# Patient Record
Sex: FEMALE | Race: WHITE | NOT HISPANIC OR LATINO | Employment: OTHER | URBAN - METROPOLITAN AREA
[De-identification: names, ages, dates, MRNs, and addresses within clinical notes are randomized per-mention and may not be internally consistent; named-entity substitution may affect disease eponyms.]

---

## 2017-01-17 RX ORDER — ESZOPICLONE 2 MG/1
TABLET, FILM COATED ORAL
Qty: 30 TAB | Refills: 0 | Status: SHIPPED | OUTPATIENT
Start: 2017-01-17 | End: 2017-02-19 | Stop reason: SDUPTHER

## 2017-01-20 ENCOUNTER — TELEPHONE (OUTPATIENT)
Dept: MEDICAL GROUP | Facility: MEDICAL CENTER | Age: 62
End: 2017-01-20

## 2017-01-20 DIAGNOSIS — L30.9 ECZEMA, UNSPECIFIED TYPE: ICD-10-CM

## 2017-01-20 NOTE — TELEPHONE ENCOUNTER
1. Caller Name: Sujata                                         Call Back Number: [phone]       Patient approves a detailed voicemail message: N\A    2. SPECIFIC Action To Be Taken: Referral pending, please sign.    3. Diagnosis/Clinical Reason for Request: Eczema    4. Specialty & Provider Name/Lab/Imaging Location: Dermatology    5. Is appointment scheduled for requested order/referral: no    Patient informed they will receive a return phone call from the office ONLY if there are any questions before processing their request. Advised to call back if they haven't received a call from the referral department in 5 days.

## 2017-02-21 RX ORDER — ESZOPICLONE 2 MG/1
TABLET, FILM COATED ORAL
Qty: 30 TAB | Refills: 0 | Status: SHIPPED | OUTPATIENT
Start: 2017-02-21 | End: 2017-03-01

## 2017-03-01 DIAGNOSIS — Z79.899 MEDICATION MANAGEMENT: ICD-10-CM

## 2017-03-01 RX ORDER — ESTRADIOL 0.5 MG/1
0.5 TABLET ORAL
Qty: 90 TAB | Refills: 3 | Status: SHIPPED | OUTPATIENT
Start: 2017-03-01 | End: 2018-05-05 | Stop reason: SDUPTHER

## 2017-03-01 RX ORDER — ESZOPICLONE 2 MG/1
TABLET, FILM COATED ORAL
Refills: 0 | OUTPATIENT
Start: 2017-03-01

## 2017-03-01 RX ORDER — ESZOPICLONE 2 MG/1
2 TABLET, FILM COATED ORAL
Qty: 30 TAB | Refills: 3 | Status: SHIPPED | OUTPATIENT
Start: 2017-03-01 | End: 2017-06-30 | Stop reason: SDUPTHER

## 2017-03-01 RX ORDER — ESZOPICLONE 2 MG/1
2 TABLET, FILM COATED ORAL
Qty: 30 TAB | Refills: 3 | Status: SHIPPED | OUTPATIENT
Start: 2017-03-01 | End: 2017-03-01 | Stop reason: SDUPTHER

## 2017-03-06 DIAGNOSIS — G44.039 EPISODIC PAROXYSMAL HEMICRANIA, NOT INTRACTABLE: ICD-10-CM

## 2017-03-06 RX ORDER — BUTALBITAL, ACETAMINOPHEN AND CAFFEINE 50; 325; 40 MG/1; MG/1; MG/1
1 TABLET ORAL EVERY 6 HOURS PRN
Qty: 60 TAB | Refills: 6 | Status: SHIPPED | OUTPATIENT
Start: 2017-03-06 | End: 2017-10-09 | Stop reason: SDUPTHER

## 2017-03-23 ENCOUNTER — RX ONLY (OUTPATIENT)
Age: 62
Setting detail: RX ONLY
End: 2017-03-23

## 2017-03-23 ENCOUNTER — HOSPITAL ENCOUNTER (OUTPATIENT)
Facility: MEDICAL CENTER | Age: 62
End: 2017-03-23
Attending: DERMATOLOGY
Payer: COMMERCIAL

## 2017-03-23 LAB
AMBIGUOUS DTTM AMBI4: NORMAL
SIGNIFICANT IND 70042: NORMAL
SITE SITE: NORMAL
SOURCE SOURCE: NORMAL

## 2017-03-23 PROCEDURE — 87070 CULTURE OTHR SPECIMN AEROBIC: CPT

## 2017-03-23 PROCEDURE — 87077 CULTURE AEROBIC IDENTIFY: CPT

## 2017-03-23 PROCEDURE — 87205 SMEAR GRAM STAIN: CPT

## 2017-03-23 PROCEDURE — 87186 SC STD MICRODIL/AGAR DIL: CPT

## 2017-03-24 LAB
GRAM STN SPEC: NORMAL
SIGNIFICANT IND 70042: NORMAL
SITE SITE: NORMAL
SOURCE SOURCE: NORMAL

## 2017-03-25 LAB
BACTERIA WND AEROBE CULT: ABNORMAL
BACTERIA WND AEROBE CULT: ABNORMAL
GRAM STN SPEC: ABNORMAL
SIGNIFICANT IND 70042: ABNORMAL
SITE SITE: ABNORMAL
SOURCE SOURCE: ABNORMAL

## 2017-06-15 ENCOUNTER — TELEPHONE (OUTPATIENT)
Dept: MEDICAL GROUP | Facility: MEDICAL CENTER | Age: 62
End: 2017-06-15

## 2017-06-15 DIAGNOSIS — E78.5 HYPERLIPIDEMIA, UNSPECIFIED HYPERLIPIDEMIA TYPE: ICD-10-CM

## 2017-06-15 DIAGNOSIS — I10 HTN (HYPERTENSION), BENIGN: ICD-10-CM

## 2017-06-15 NOTE — TELEPHONE ENCOUNTER
1. Caller Name: Sujata                      Call Back Number: [phone] (home) [phone] (work)      2. Message: patient has appt. In aug. For physical and also healthy tracks and would like to get her labs drawn before her appt. Can you please submit the orders     3. Patient approves office to leave a detailed voicemail/MyChart message: N/A

## 2017-07-21 ENCOUNTER — HOSPITAL ENCOUNTER (OUTPATIENT)
Dept: LAB | Facility: MEDICAL CENTER | Age: 62
End: 2017-07-21
Attending: INTERNAL MEDICINE
Payer: COMMERCIAL

## 2017-07-21 ENCOUNTER — HOSPITAL ENCOUNTER (OUTPATIENT)
Dept: LAB | Facility: MEDICAL CENTER | Age: 62
End: 2017-07-21
Payer: COMMERCIAL

## 2017-07-21 DIAGNOSIS — I10 HTN (HYPERTENSION), BENIGN: ICD-10-CM

## 2017-07-21 DIAGNOSIS — E78.5 HYPERLIPIDEMIA, UNSPECIFIED HYPERLIPIDEMIA TYPE: ICD-10-CM

## 2017-07-21 LAB
ANION GAP SERPL CALC-SCNC: 9 MMOL/L (ref 0–11.9)
BDY FAT % MEASURED: 34.2 %
BP DIAS: 74 MMHG
BP SYS: 146 MMHG
BUN SERPL-MCNC: 11 MG/DL (ref 8–22)
CALCIUM SERPL-MCNC: 9.1 MG/DL (ref 8.5–10.5)
CHLORIDE SERPL-SCNC: 104 MMOL/L (ref 96–112)
CHOLEST SERPL-MCNC: 174 MG/DL (ref 100–199)
CHOLEST SERPL-MCNC: 175 MG/DL (ref 100–199)
CO2 SERPL-SCNC: 27 MMOL/L (ref 20–33)
CREAT SERPL-MCNC: 0.65 MG/DL (ref 0.5–1.4)
DIABETES HTDIA: NO
EVENT NAME HTEVT: NORMAL
FASTING HTFAS: YES
GFR SERPL CREATININE-BSD FRML MDRD: >60 ML/MIN/1.73 M 2
GLUCOSE SERPL-MCNC: 94 MG/DL (ref 65–99)
GLUCOSE SERPL-MCNC: 94 MG/DL (ref 65–99)
HDLC SERPL-MCNC: 59 MG/DL
HDLC SERPL-MCNC: 59 MG/DL
HYPERTENSION HTHYP: YES
LDLC SERPL CALC-MCNC: 73 MG/DL
LDLC SERPL CALC-MCNC: 74 MG/DL
POTASSIUM SERPL-SCNC: 4.4 MMOL/L (ref 3.6–5.5)
SCREENING LOC CITY HTCIT: NORMAL
SCREENING LOC STATE HTSTA: NORMAL
SCREENING LOCATION HTLOC: NORMAL
SMOKING HTSMO: NO
SODIUM SERPL-SCNC: 140 MMOL/L (ref 135–145)
SUBSCRIBER ID HTSID: NORMAL
TRIGL SERPL-MCNC: 208 MG/DL (ref 0–149)
TRIGL SERPL-MCNC: 209 MG/DL (ref 0–149)

## 2017-07-21 PROCEDURE — 80048 BASIC METABOLIC PNL TOTAL CA: CPT

## 2017-07-21 PROCEDURE — S5190 WELLNESS ASSESSMENT BY NONPH: HCPCS

## 2017-07-21 PROCEDURE — 80061 LIPID PANEL: CPT

## 2017-07-21 PROCEDURE — 36415 COLL VENOUS BLD VENIPUNCTURE: CPT

## 2017-07-21 PROCEDURE — 82947 ASSAY GLUCOSE BLOOD QUANT: CPT

## 2017-07-21 PROCEDURE — 80061 LIPID PANEL: CPT | Mod: 91

## 2017-08-18 ENCOUNTER — OFFICE VISIT (OUTPATIENT)
Dept: MEDICAL GROUP | Facility: MEDICAL CENTER | Age: 62
End: 2017-08-18
Payer: COMMERCIAL

## 2017-08-18 VITALS
HEART RATE: 64 BPM | TEMPERATURE: 97.9 F | WEIGHT: 144 LBS | HEIGHT: 65 IN | BODY MASS INDEX: 23.99 KG/M2 | RESPIRATION RATE: 16 BRPM | DIASTOLIC BLOOD PRESSURE: 72 MMHG | SYSTOLIC BLOOD PRESSURE: 138 MMHG | OXYGEN SATURATION: 97 %

## 2017-08-18 DIAGNOSIS — E78.5 HYPERLIPIDEMIA, UNSPECIFIED HYPERLIPIDEMIA TYPE: ICD-10-CM

## 2017-08-18 DIAGNOSIS — R79.89 LOW VITAMIN D LEVEL: ICD-10-CM

## 2017-08-18 DIAGNOSIS — Z00.00 ROUTINE GENERAL MEDICAL EXAMINATION AT A HEALTH CARE FACILITY: ICD-10-CM

## 2017-08-18 DIAGNOSIS — Z13.21 ENCOUNTER FOR VITAMIN DEFICIENCY SCREENING: ICD-10-CM

## 2017-08-18 DIAGNOSIS — N95.9 UNSPECIFIED MENOPAUSAL AND POSTMENOPAUSAL DISORDER: ICD-10-CM

## 2017-08-18 DIAGNOSIS — F43.0 STRESS REACTION: ICD-10-CM

## 2017-08-18 DIAGNOSIS — G47.9 DISTURBANCE IN SLEEP BEHAVIOR: ICD-10-CM

## 2017-08-18 DIAGNOSIS — K21.9 GASTROESOPHAGEAL REFLUX DISEASE WITHOUT ESOPHAGITIS: ICD-10-CM

## 2017-08-18 DIAGNOSIS — I10 HTN (HYPERTENSION), BENIGN: ICD-10-CM

## 2017-08-18 PROCEDURE — 99396 PREV VISIT EST AGE 40-64: CPT | Performed by: INTERNAL MEDICINE

## 2017-08-18 RX ORDER — CITALOPRAM 20 MG/1
20 TABLET ORAL DAILY
Qty: 30 TAB | Refills: 11 | Status: SHIPPED | OUTPATIENT
Start: 2017-08-18 | End: 2020-03-02

## 2017-08-18 RX ORDER — TRAZODONE HYDROCHLORIDE 50 MG/1
50 TABLET ORAL NIGHTLY PRN
Qty: 30 TAB | Refills: 3 | Status: SHIPPED | OUTPATIENT
Start: 2017-08-18 | End: 2018-12-28

## 2017-08-18 ASSESSMENT — PATIENT HEALTH QUESTIONNAIRE - PHQ9: CLINICAL INTERPRETATION OF PHQ2 SCORE: 0

## 2017-08-18 NOTE — ASSESSMENT & PLAN NOTE
She has a history of hypertension for which she takes losartan 25 mg daily. She tries to follow a low-salt diet. She does not exercise.

## 2017-08-18 NOTE — ASSESSMENT & PLAN NOTE
She is under a large amount of stress with her mother having Alzheimer's disease. She thinks her  is developing Alzheimer's disease. 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 She would like to have some medication to help her cope with really stressful situations.

## 2017-08-18 NOTE — ASSESSMENT & PLAN NOTE
She has mild hyperlipidemia with cholesterol 174, triglycerides 208, HDL 59, LDL 73. She tries to follow a fairly healthy diet. She does not exercise significantly.

## 2017-08-18 NOTE — ASSESSMENT & PLAN NOTE
Her sleep disturbance problem remains about the same. She is under a large amount of stress both at home and at work.

## 2017-08-18 NOTE — MR AVS SNAPSHOT
"        Sujata Upton   2017 3:00 PM   Office Visit   MRN: [id number]    Department:  [address] [place] [address]   Dept Phone:  [phone]    Description:  Female : 1955   Provider:  Donald Potter M.D.           Reason for Visit     Annual Exam           Allergies as of 2017     Allergen Noted Reactions    Pcn [Penicillins] 2012   Anaphylaxis      You were diagnosed with     Unspecified menopausal and postmenopausal disorder   [627.9.ICD-9-CM]       Hyperlipidemia, unspecified hyperlipidemia type   [3287214]         Vital Signs     Blood Pressure Pulse Temperature Respirations Height Weight    138/72 mmHg 64 36.6 °C (97.9 °F) 16 1.651 m (5' 5\") 65.318 kg (144 lb)    Body Mass Index Oxygen Saturation Smoking Status             23.96 kg/m2 97% Never Smoker          Basic Information     Date Of Birth Sex Race Ethnicity Preferred Language    1955 Female White Non- English      Your appointments     Nov 15, 2017  4:00 PM   Established Patient with Donald Potter M.D.   [facility] [address]    75 [facility] [address]  [facility] [address]   [phone]           You will be receiving a confirmation call a few days before your appointment from our automated call confirmation system.              Problem List              ICD-10-CM Priority Class Noted - Resolved    HTN (hypertension), benign I10   2015 - Present    Disturbance in sleep behavior G47.9   2015 - Present    Esophageal reflux K21.9   2015 - Present    Headache R51   2015 - Present    Hot flushes, perimenopausal N95.1   2015 - Present    Tear of medial cartilage or meniscus of knee, current [id number]   2015 - Present    Acute lateral meniscus tear of left knee S83.282A   2015 - Present    Hyperlipidemia E78.5   2016 - Present    Routine general medical examination at a health care facility Z00.00   2016 - Present    Neck mass R22.1   2016 - Present      "   Health Maintenance        Date Due Completion Dates    IMM DTaP/Tdap/Td Vaccine (1 - Tdap) 6/22/1972 ---    IMM ZOSTER VACCINE 6/22/2013 ---    MAMMOGRAM 7/1/2017 7/1/2016, 6/30/2016, 2/5/2013, 4/13/2011, 1/26/2010, 1/26/2010, 9/25/2008, 9/25/2008, 4/3/2007, 3/7/2006, 1/24/2005    IMM INFLUENZA (1) 9/1/2017 10/27/2016, 10/22/2015, 10/8/2014, 10/24/2013, 1/25/2013, 10/3/2012    COLONOSCOPY 9/29/2025 9/29/2015            Current Immunizations     Influenza TIV (IM) 10/24/2013, 1/25/2013, 10/3/2012    Influenza Vaccine Quad Inj (Pf) 10/27/2016, 10/8/2014    Influenza Vaccine Quad Inj (Preserved) 10/22/2015    Tuberculin Skin Test 12/11/2013 12:10 PM, 1/23/2013, 1/16/2013 12:15 PM, 12/21/2011 12:00 PM      Below and/or attached are the medications your provider expects you to take. Review all of your home medications and newly ordered medications with your provider and/or pharmacist. Follow medication instructions as directed by your provider and/or pharmacist. Please keep your medication list with you and share with your provider. Update the information when medications are discontinued, doses are changed, or new medications (including over-the-counter products) are added; and carry medication information at all times in the event of emergency situations     Allergies:  PCN - Anaphylaxis               Medications  Valid as of: August 18, 2017 -  3:51 PM    Generic Name Brand Name Tablet Size Instructions for use    Azithromycin (Tab) ZITHROMAX 250 MG 2 TABS ON DAY 1, 1 TAB ON DAYS 2-5.        Butalbital-APAP-Caffeine (Tab) FIORICET -40 MG Take 1 Tab by mouth every 6 hours as needed for Headache.        Calcium Carbonate-Vit D-Min   Take  by mouth.        Citalopram Hydrobromide (Tab) CELEXA 20 MG Take 1 Tab by mouth every day.        Estradiol (Tab) ESTRACE 0.5 MG Take 1 Tab by mouth every day.        Eszopiclone (Tab) Eszopiclone 2 MG TAKE 1 TABLET ORALLY AT BEDTIME AS NEEDED        Hydrocod Polst-Chlorphen  Polst (Suspension Extended Release) TUSSIONEX 10-8 MG/5ML Take 5 mL by mouth every 12 hours as needed (TAKE IF NEEDED FOR NASAL SYMPTOMS OR COUGH. MAY CAUSE DROWSINESS.).        Ibuprofen (Tab) MOTRIN 200 MG Take 200 mg by mouth every 6 hours as needed.        Losartan Potassium (Tab) COZAAR 25 MG TAKE 1 TAB BY MOUTH EVERY DAY.        Multiple Vitamins-Minerals (Tab) THERAGRAN-M  Take 1 Tab by mouth every day.        TraZODone HCl (Tab) DESYREL 50 MG Take 1 Tab by mouth at bedtime as needed for Sleep.        Zolpidem Tartrate (Tab) AMBIEN 5 MG Take 1 Tab by mouth at bedtime as needed for Sleep. Do not take with lunesta        .                 Medicines prescribed today were sent to:     [facility]/PHARMACY #[id number] - [place], [place] - [address] [address]    75 [facility] [address] [place] [address]    Phone: [phone] Fax: [phone]    Open 24 Hours?: No      Medication refill instructions:       If your prescription bottle indicates you have medication refills left, it is not necessary to call your provider’s office. Please contact your pharmacy and they will refill your medication.    If your prescription bottle indicates you do not have any refills left, you may request refills at any time through one of the following ways: The online [employer] system (except Urgent Care), by calling your provider’s office, or by asking your pharmacy to contact your provider’s office with a refill request. Medication refills are processed only during regular business hours and may not be available until the next business day. Your provider may request additional information or to have a follow-up visit with you prior to refilling your medication.   *Please Note: Medication refills are assigned a new Rx number when refilled electronically. Your pharmacy may indicate that no refills were authorized even though a new prescription for the same medication is available at the pharmacy. Please request the medicine by name with the pharmacy before  contacting your provider for a refill.        Your To Do List     Future Labs/Procedures Complete By Expires    DS-BONE DENSITY STUDY (DEXA)  As directed 2/18/2018    LIPID PROFILE  As directed 8/19/2018         [employer] Access Code: Activation code not generated  Current [email or web address] Status: Active

## 2017-08-18 NOTE — PROGRESS NOTES
Chief Complaint:     Sujata Upton is a 62 y.o. female who presents for annual exam and follow-up of her various health problems.    Low vitamin D level  Vitamin D level has been low at 24. She is not sure whether she is taking a vitamin D supplement.    Hyperlipidemia  She has mild hyperlipidemia with cholesterol 174, triglycerides 208, HDL 59, LDL 73. She tries to follow a fairly healthy diet. She does not exercise significantly.    HTN (hypertension), benign  She has a history of hypertension for which she takes losartan 25 mg daily. She tries to follow a low-salt diet. She does not exercise.    Esophageal reflux  Her gastroesophageal reflux is under fairly good control primarily with diet.    Disturbance in sleep behavior  Her sleep disturbance problem remains about the same. She is under a large amount of stress both at home and at work.    Stress reaction  She is under a large amount of stress with her mother having Alzheimer's disease. She thinks her  is developing Alzheimer's disease. 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 She would like to have some medication to help her cope with really stressful situations.      Pt has GYN provider: no  Last colonoscopy: 9/29/15  Bone density test:yes  Gardiasil:no   Last Td: 3 or 4 years.  Regular exercise: no     She  has a past medical history of HTN (hypertension), benign (1/9/2015); Sleep disturbance, unspecified (1/9/2015); Esophageal reflux (1/9/2015); Headache (1/9/2015); Hot flushes, perimenopausal (1/9/2015); Pain (7/1); Anesthesia; Routine general medical examination at a health care facility (8/16/2016); Neck mass (12/2/2016); and Stress reaction (8/18/2017). She also has no past medical history of Breast cancer (CMS-[facility]).  She  has past surgical history that includes hysterectomy laparoscopy (2003); cyst excision (2000); vein stripping (Bilateral, 1998); other (2004); knee arthroscopy (Left, 11/12/2015); medial meniscectomy (Left,  11/12/2015); knee arthroscopy (Left, 7/2/2015); and medial meniscectomy (Left, 7/2/2015).  Current Outpatient Prescriptions   Medication Sig Dispense Refill   • citalopram (CELEXA) 20 MG Tab Take 1 Tab by mouth every day. 30 Tab 11   • trazodone (DESYREL) 50 MG Tab Take 1 Tab by mouth at bedtime as needed for Sleep. 30 Tab 3   • losartan (COZAAR) 25 MG Tab TAKE 1 TAB BY MOUTH EVERY DAY. 90 Tab 3   • Eszopiclone 2 MG Tab TAKE 1 TABLET ORALLY AT BEDTIME AS NEEDED 30 Tab 1   • zolpidem (AMBIEN) 5 MG Tab Take 1 Tab by mouth at bedtime as needed for Sleep. Do not take with lunesta 30 Tab 0   • acetaminophen/caffeine/butalbital 325-40-50 mg (FIORICET) -40 MG Tab Take 1 Tab by mouth every 6 hours as needed for Headache. 60 Tab 6   • estradiol (ESTRACE) 0.5 MG tablet Take 1 Tab by mouth every day. 90 Tab 3   • Hydrocod Polst-CPM Polst ER (TUSSIONEX) 10-8 MG/5ML Suspension Extended Release Take 5 mL by mouth every 12 hours as needed (TAKE IF NEEDED FOR NASAL SYMPTOMS OR COUGH. MAY CAUSE DROWSINESS.). 120 mL 0   • ibuprofen (MOTRIN) 200 MG Tab Take 200 mg by mouth every 6 hours as needed.     • therapeutic multivitamin-minerals (THERAGRAN-M) Tab Take 1 Tab by mouth every day.     • Calcium Carbonate-Vit D-Min (CALCIUM 1200 PO) Take  by mouth.     • azithromycin (ZITHROMAX) 250 MG Tab 2 TABS ON DAY 1, 1 TAB ON DAYS 2-5. 6 Tab 1     No current facility-administered medications for this visit.     Social History   Substance Use Topics   • Smoking status: Never Smoker    • Smokeless tobacco: Never Used   • Alcohol Use: 0.6 oz/week     1 Glasses of wine per week         ROS:   General:  no recent change in strength, weight, appetite, or exercise tolerance.  CNS: no significant lightheadedness, headaches, fainting spells, seizures, or change in mentation.  EENT: no significant change in vision, hearing, sense of smell, swallowing, or voice.  RESP: no significant wheezing, coughing, or dyspnea.  Breasts: No visual changes,  "masses, retractions or discharge.  CV: no significant palpitations or chest pain.  G.I.: no significant indigestion, abdominal pain, or change in bowel habits.  : no significant change in urinating, no dysuria or hematuria.  GYN: no significant vaginal discharge, unusual bleeding, discomfort, pelvic pain, or change in sexual function.  EXTREM:  no significant edema, swelling, pain, or limitations of motion.  INTEG: no significant change in skin, hair or fingernails.  LYMPH: no significant adenopathy or other masses.  PSYCH: no significant anxiety or depression.      PHYSICAL EXAMINATION:  Body mass index is 23.96 kg/(m^2).  Wt Readings from Last 4 Encounters:   08/18/17 65.318 kg (144 lb)   12/02/16 62.506 kg (137 lb 12.8 oz)   08/16/16 68.04 kg (150 lb)   07/22/16 70.761 kg (156 lb)       PE:  /72 mmHg  Pulse 64  Temp(Src) 36.6 °C (97.9 °F)  Resp 16  Ht 1.651 m (5' 5\")  Wt 65.318 kg (144 lb)  BMI 23.96 kg/m2  SpO2 97%    GEN: clean, well groomed, in no acute distress, alert.  EENT: PERRL, normal EOMs, fundi unremarkable, normal external auditory canals and tympanic membranes, pharynx unremarkable, dentition satisfactory, nose unremarkable, neck supple and without significant lymphadenopathy or masses, trachea midline, thyroid unremarkable.  LUNGS: bilateral breast sounds, without significant wheezes or rales or abnormalities in percussion.  BREASTS: normal to inspection, no significant masses to palpation, retractions or nipple discharge.  CV:  peripheral circulation is satisfactory, pedal pulses are satisfactory, heart sounds are unremarkable.  ABD: soft, without significant masses, no hepatosplenomegaly, no tenderness, bowel sounds are normal, no significant inguinal adenopathy. Pelvic and rectal exams were not performed at this time.  EXTREM:  without significant edema, cyanosis or deformity.  LYMPH:  no significant lymphadenopathy or lymphedema.  INTEG:  skin, hair, fingernails are unremarkable " without significant rashes or lesions  NEURO:  essentially normal sensation, strength, gate, and cerebellar function, normal DTRs, Babinski is negative, affect is normal, oriented times three.  PSYCH: appropriate affect and mood.    ASSESSMENT/PLAN:  1. Unspecified menopausal and postmenopausal disorder  DS-BONE DENSITY STUDY (DEXA)    She will continue Estrace for now.   2. Hyperlipidemia, unspecified hyperlipidemia type  LIPID PROFILE    We again reviewed in some detail appropriate diet. She was encouraged to exercise.   3. HTN (hypertension), benign      Follow low-salt diet. She will continue losartan and small dose.   4. Disturbance in sleep behavior      Continue same regimen. We reviewed potential side effects to her medications.   5. Gastroesophageal reflux disease without esophagitis      We briefly reviewed appropriate diet. She takes occasional Tums with good relief.   6. Low vitamin D level      She will increase her vitamin D supplement and we will check another level.   7. Encounter for vitamin deficiency screening  VITAMIN D,25 HYDROXY   8. Routine general medical examination at a health care facility     9. Stress reaction      She will try a taking Celexa 20 mg daily. If that does not work, she will try trazodone. We reviewed potential side effects to both.     HCM:    Labs per orders  Immunizations per orders  Pt counseled about skin care, diet, supplements, and exercise.  Next office visit for recheck of chronic medical conditions is due in 3 months

## 2017-08-21 ENCOUNTER — APPOINTMENT (OUTPATIENT)
Dept: RADIOLOGY | Facility: IMAGING CENTER | Age: 62
End: 2017-08-21
Attending: PHYSICIAN ASSISTANT
Payer: COMMERCIAL

## 2017-08-21 ENCOUNTER — OFFICE VISIT (OUTPATIENT)
Dept: URGENT CARE | Facility: CLINIC | Age: 62
End: 2017-08-21
Payer: COMMERCIAL

## 2017-08-21 VITALS
WEIGHT: 146 LBS | BODY MASS INDEX: 24.32 KG/M2 | TEMPERATURE: 98.4 F | HEIGHT: 65 IN | DIASTOLIC BLOOD PRESSURE: 78 MMHG | HEART RATE: 94 BPM | OXYGEN SATURATION: 96 % | RESPIRATION RATE: 16 BRPM | SYSTOLIC BLOOD PRESSURE: 124 MMHG
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
## 2017-08-21 DIAGNOSIS — S46.911A SHOULDER STRAIN, RIGHT, INITIAL ENCOUNTER: ICD-10-CM

## 2017-08-21 DIAGNOSIS — S16.1XXA ACUTE CERVICAL MYOFASCIAL STRAIN, INITIAL ENCOUNTER: ICD-10-CM

## 2017-08-21 PROCEDURE — 99213 OFFICE O/P EST LOW 20 MIN: CPT | Performed by: PHYSICIAN ASSISTANT

## 2017-08-21 RX ORDER — TRAMADOL HYDROCHLORIDE 50 MG/1
50 TABLET ORAL EVERY 8 HOURS PRN
Qty: 15 TAB | Refills: 0 | Status: SHIPPED | OUTPATIENT
Start: 2017-08-21 | End: 2017-09-01

## 2017-08-21 ASSESSMENT — ENCOUNTER SYMPTOMS
VOMITING: 0
BLURRED VISION: 0
MYALGIAS: 0
SPEECH CHANGE: 0
SHORTNESS OF BREATH: 0
DIZZINESS: 0
FEVER: 0
CHILLS: 0
BLOOD IN STOOL: 0
ABDOMINAL PAIN: 0
DIARRHEA: 1
HEADACHES: 1
BACK PAIN: 0
NECK PAIN: 1
TINGLING: 0
NAUSEA: 0
FALLS: 1
LOSS OF CONSCIOUSNESS: 0
FLANK PAIN: 0
PALPITATIONS: 0
SENSORY CHANGE: 0

## 2017-08-21 NOTE — MR AVS SNAPSHOT
"        Sujata Vasquez Abdrigoberto   2017 1:30 PM   Office Visit   MRN: [id number]    Department:  [facility]   Dept Phone:  [phone]    Description:  Female : 1955   Provider:  Marco Escobar PA-C           Reason for Visit     Fall x2days, fell down the stairs, right arm pain, shoulder pain, neck pain, headache      Allergies as of 2017     Allergen Noted Reactions    Pcn [Penicillins] 2012   Anaphylaxis      You were diagnosed with     Shoulder strain, right, initial encounter   [472869]       Acute cervical myofascial strain, initial encounter   [2495495]       Fall, initial encounter   [574786]         Vital Signs     Blood Pressure Pulse Temperature Respirations Height Weight    124/78 mmHg 94 36.9 °C (98.4 °F) 16 1.651 m (5' 5\") 66.225 kg (146 lb)    Body Mass Index Oxygen Saturation Smoking Status             24.30 kg/m2 96% Never Smoker          Basic Information     Date Of Birth Sex Race Ethnicity Preferred Language    1955 Female White Non- English      Your appointments     Nov 15, 2017  4:00 PM   Established Patient with Donald Potter M.D.   [facility] [address]    [address]  [address]  [place] [address] [phone]           You will be receiving a confirmation call a few days before your appointment from our automated call confirmation system.              Problem List              ICD-10-CM Priority Class Noted - Resolved    HTN (hypertension), benign I10   2015 - Present    Disturbance in sleep behavior G47.9   2015 - Present    Esophageal reflux K21.9   2015 - Present    Headache R51   2015 - Present    Hot flushes, perimenopausal N95.1   2015 - Present    Tear of medial cartilage or meniscus of knee, current [id number]   2015 - Present    Acute lateral meniscus tear of left knee S83.282A   2015 - Present    Hyperlipidemia E78.5   2016 - Present    Routine general medical examination at a " [facility] Z00.00   8/16/2016 - Present    Neck mass R22.1   12/2/2016 - Present    Low vitamin D level E55.9   8/18/2017 - Present    Stress reaction F43.0   8/18/2017 - Present      Health Maintenance        Date Due Completion Dates    IMM DTaP/Tdap/Td Vaccine (1 - Tdap) 6/22/1972 ---    IMM ZOSTER VACCINE 6/22/2013 ---    MAMMOGRAM 7/1/2017 7/1/2016, 6/30/2016, 2/5/2013, 4/13/2011, 1/26/2010, 1/26/2010, 9/25/2008, 9/25/2008, 4/3/2007, 3/7/2006, 1/24/2005    IMM INFLUENZA (1) 9/1/2017 10/27/2016, 10/22/2015, 10/8/2014, 10/24/2013, 1/25/2013, 10/3/2012    COLONOSCOPY 9/29/2025 9/29/2015            Current Immunizations     Influenza TIV (IM) 10/24/2013, 1/25/2013, 10/3/2012    Influenza Vaccine Quad Inj (Pf) 10/27/2016, 10/8/2014    Influenza Vaccine Quad Inj (Preserved) 10/22/2015    Tuberculin Skin Test 12/11/2013 12:10 PM, 1/23/2013, 1/16/2013 12:15 PM, 12/21/2011 12:00 PM      Below and/or attached are the medications your provider expects you to take. Review all of your home medications and newly ordered medications with your provider and/or pharmacist. Follow medication instructions as directed by your provider and/or pharmacist. Please keep your medication list with you and share with your provider. Update the information when medications are discontinued, doses are changed, or new medications (including over-the-counter products) are added; and carry medication information at all times in the event of emergency situations     Allergies:  PCN - Anaphylaxis               Medications  Valid as of: August 21, 2017 -  2:35 PM    Generic Name Brand Name Tablet Size Instructions for use    Azithromycin (Tab) ZITHROMAX 250 MG 2 TABS ON DAY 1, 1 TAB ON DAYS 2-5.        Butalbital-APAP-Caffeine (Tab) FIORICET -40 MG Take 1 Tab by mouth every 6 hours as needed for Headache.        Calcium Carbonate-Vit D-Min   Take  by mouth.        Citalopram Hydrobromide (Tab) CELEXA 20 MG Take 1 Tab by mouth every  day.        Estradiol (Tab) ESTRACE 0.5 MG Take 1 Tab by mouth every day.        Eszopiclone (Tab) Eszopiclone 2 MG TAKE 1 TABLET ORALLY AT BEDTIME AS NEEDED        Hydrocod Polst-Chlorphen Polst (Suspension Extended Release) TUSSIONEX 10-8 MG/5ML Take 5 mL by mouth every 12 hours as needed (TAKE IF NEEDED FOR NASAL SYMPTOMS OR COUGH. MAY CAUSE DROWSINESS.).        Ibuprofen (Tab) MOTRIN 200 MG Take 200 mg by mouth every 6 hours as needed.        Losartan Potassium (Tab) COZAAR 25 MG TAKE 1 TAB BY MOUTH EVERY DAY.        Multiple Vitamins-Minerals (Tab) THERAGRAN-M  Take 1 Tab by mouth every day.        TraMADol HCl (Tab) ULTRAM 50 MG Take 1 Tab by mouth every 8 hours as needed for Moderate Pain or Severe Pain.        TraZODone HCl (Tab) DESYREL 50 MG Take 1 Tab by mouth at bedtime as needed for Sleep.        Zolpidem Tartrate (Tab) AMBIEN 5 MG Take 1 Tab by mouth at bedtime as needed for Sleep. Do not take with lunesta        .                 Medicines prescribed today were sent to:     [facility]/PHARMACY #[id number] - [place], [place] - [address] [address]    [address] [address]    Phone: [phone] Fax: [phone]    Open 24 Hours?: No      Medication refill instructions:       If your prescription bottle indicates you have medication refills left, it is not necessary to call your provider’s office. Please contact your pharmacy and they will refill your medication.    If your prescription bottle indicates you do not have any refills left, you may request refills at any time through one of the following ways: The online [employer] system (except Urgent Care), by calling your provider’s office, or by asking your pharmacy to contact your provider’s office with a refill request. Medication refills are processed only during regular business hours and may not be available until the next business day. Your provider may request additional information or to have a follow-up visit with you prior to refilling your  medication.   *Please Note: Medication refills are assigned a new Rx number when refilled electronically. Your pharmacy may indicate that no refills were authorized even though a new prescription for the same medication is available at the pharmacy. Please request the medicine by name with the pharmacy before contacting your provider for a refill.        Referral     A referral request has been sent to our patient care coordination department. Please allow 3-5 business days for us to process this request and contact you either by phone or mail. If you do not hear from us by the 5th business day, please call us at [phone].           Attender Access Code: Activation code not generated  Current Attender Status: Active

## 2017-08-21 NOTE — PROGRESS NOTES
Subjective:      Sujata Upton is a 62 y.o. female who presents with Fall            Fall  Associated symptoms include headaches. Pertinent negatives include no abdominal pain, fever, hematuria, loss of consciousness, nausea, tingling or vomiting.   2d s/p fall, slid down stairs forward, arm still hung on to railing, jerked right arm up and back, denies LOC but immediate HA, main concern is right shoulder pain, concerned for a tear, ibu for pain, tylenol and ibu w./ no relief, denies PMH of shoulder surgery,  Limited in ROM, c/o sharp pain to right shoulder. Arm motion causeds pain. Denies visual changes, denies dizzines, was intitally but reslolved, denies nausa/vomiting/abdpain, denioes blood per stool. Denies saddle anesthesia, incontinence of urine or bowel, retention of urine or bowel, also denies numbness/tingling or weakness to UE/LE.    swelling to right hand, now resolved. Improving wants check up.     Review of Systems   Constitutional: Negative for fever and chills.   HENT: Negative for ear discharge, ear pain, hearing loss and tinnitus.    Eyes: Negative for blurred vision.   Respiratory: Negative for shortness of breath.    Cardiovascular: Negative for chest pain, palpitations and leg swelling.   Gastrointestinal: Positive for diarrhea. Negative for nausea, vomiting, abdominal pain, blood in stool and melena.   Genitourinary: Negative for hematuria and flank pain.   Musculoskeletal: Positive for joint pain ( right shoulder and neck pain s/p fall), falls ( single in home) and neck pain. Negative for myalgias and back pain.   Skin: Negative for rash.   Neurological: Positive for headaches. Negative for dizziness, tingling, sensory change, speech change and loss of consciousness.       PMH:  has a past medical history of HTN (hypertension), benign (1/9/2015); Sleep disturbance, unspecified (1/9/2015); Esophageal reflux (1/9/2015); Headache (1/9/2015); Hot flushes, perimenopausal (1/9/2015); Pain (7/1);  Anesthesia; Routine general medical examination at a health care facility (8/16/2016); Neck mass (12/2/2016); and Stress reaction (8/18/2017). She also has no past medical history of Breast cancer (CMS-HCC).  MEDS:   Current outpatient prescriptions:   •  citalopram (CELEXA) 20 MG Tab, Take 1 Tab by mouth every day., Disp: 30 Tab, Rfl: 11  •  trazodone (DESYREL) 50 MG Tab, Take 1 Tab by mouth at bedtime as needed for Sleep., Disp: 30 Tab, Rfl: 3  •  losartan (COZAAR) 25 MG Tab, TAKE 1 TAB BY MOUTH EVERY DAY., Disp: 90 Tab, Rfl: 3  •  Eszopiclone 2 MG Tab, TAKE 1 TABLET ORALLY AT BEDTIME AS NEEDED, Disp: 30 Tab, Rfl: 1  •  zolpidem (AMBIEN) 5 MG Tab, Take 1 Tab by mouth at bedtime as needed for Sleep. Do not take with lunesta, Disp: 30 Tab, Rfl: 0  •  acetaminophen/caffeine/butalbital 325-40-50 mg (FIORICET) -40 MG Tab, Take 1 Tab by mouth every 6 hours as needed for Headache., Disp: 60 Tab, Rfl: 6  •  estradiol (ESTRACE) 0.5 MG tablet, Take 1 Tab by mouth every day., Disp: 90 Tab, Rfl: 3  •  azithromycin (ZITHROMAX) 250 MG Tab, 2 TABS ON DAY 1, 1 TAB ON DAYS 2-5., Disp: 6 Tab, Rfl: 1  •  Hydrocod Polst-CPM Polst ER (TUSSIONEX) 10-8 MG/5ML Suspension Extended Release, Take 5 mL by mouth every 12 hours as needed (TAKE IF NEEDED FOR NASAL SYMPTOMS OR COUGH. MAY CAUSE DROWSINESS.)., Disp: 120 mL, Rfl: 0  •  ibuprofen (MOTRIN) 200 MG Tab, Take 200 mg by mouth every 6 hours as needed., Disp: , Rfl:   •  therapeutic multivitamin-minerals (THERAGRAN-M) Tab, Take 1 Tab by mouth every day., Disp: , Rfl:   •  Calcium Carbonate-Vit D-Min (CALCIUM 1200 PO), Take  by mouth., Disp: , Rfl:   ALLERGIES:   Allergies   Allergen Reactions   • Pcn [Penicillins] Anaphylaxis     SURGHX:   Past Surgical History   Procedure Laterality Date   • Hysterectomy laparoscopy  2003   • Cyst excision  2000     off tonsil   • Vein stripping Bilateral 1998     lower extremities   • Other  2004     mass removed from throat   • Knee arthroscopy  "Left 11/12/2015     Procedure: KNEE ARTHROSCOPY revision;  Surgeon: Adam Davila M.D.;  Location: SURGERY [facility];  Service:    • Medial meniscectomy Left 11/12/2015     Procedure: MEDIAL MENISCECTOMY partial, lateral meniscectomy and synovectomy;  Surgeon: Adam Davila M.D.;  Location: [facility];  Service:    • Knee arthroscopy Left 7/2/2015     Procedure: KNEE ARTHROSCOPY;  Surgeon: Adam Davila M.D.;  Location: [facility];  Service:    • Medial meniscectomy Left 7/2/2015     Procedure: MEDIAL MENISCECTOMY/ PARTIAL;  Surgeon: Adam Davila M.D.;  Location: [facility];  Service:      SOCHX:  reports that she has never smoked. She has never used smokeless tobacco. She reports that she drinks about 0.6 oz of alcohol per week. She reports that she does not use illicit drugs.  FH: Family history was reviewed, no pertinent findings to report        Objective:     /78 mmHg  Pulse 94  Temp(Src) 36.9 °C (98.4 °F)  Resp 16  Ht 1.651 m (5' 5\")  Wt 66.225 kg (146 lb)  BMI 24.30 kg/m2  SpO2 96%     Physical Exam   Constitutional: She is oriented to person, place, and time. She appears well-developed and well-nourished. No distress.   HENT:   Head: Normocephalic and atraumatic.   Right Ear: Tympanic membrane, external ear and ear canal normal.   Left Ear: Tympanic membrane, external ear and ear canal normal.   Nose: Nose normal. Right sinus exhibits no maxillary sinus tenderness and no frontal sinus tenderness. Left sinus exhibits no maxillary sinus tenderness and no frontal sinus tenderness.   Mouth/Throat: Uvula is midline, oropharynx is clear and moist and mucous membranes are normal.   Eyes: Conjunctivae, EOM and lids are normal. Pupils are equal, round, and reactive to light. Right eye exhibits no discharge. Left eye exhibits no discharge. No scleral icterus.   Neck: Trachea normal and phonation normal. Neck supple. No JVD present. Spinous process tenderness ( " c3-4) and muscular tenderness ( primary paraspinal muscular ttp, some mild bony ttp) present. No rigidity. Decreased range of motion present. No edema and no erythema present.   Slow and full AROM   Cardiovascular: Normal rate, regular rhythm, normal heart sounds and intact distal pulses.   No extrasystoles are present.   Pulmonary/Chest: Effort normal and breath sounds normal. No respiratory distress. She has no decreased breath sounds. She has no wheezes. She has no rhonchi. She has no rales. She exhibits no tenderness, no bony tenderness, no crepitus and no edema.   Musculoskeletal:        Right shoulder: She exhibits tenderness ( trapezius) and pain. She exhibits normal range of motion, no bony tenderness, no swelling, no effusion, no crepitus, no deformity, no laceration, no spasm, normal pulse and normal strength ( RC 5/5).        Cervical back: She exhibits decreased range of motion ( slow tight, lacking some in all planes), tenderness ( muscular), pain and spasm ( trap). She exhibits no swelling, no edema, no deformity, no laceration and normal pulse. Bony tenderness:  c/3-4.        Thoracic back: Normal.        Lumbar back: Normal.   Neurological: She is alert and oriented to person, place, and time. She has normal strength and normal reflexes. She is not disoriented. No cranial nerve deficit or sensory deficit. She exhibits normal muscle tone. Coordination and gait normal. GCS eye subscore is 4. GCS verbal subscore is 5. GCS motor subscore is 6.   Skin: Skin is warm and dry. She is not diaphoretic. No erythema. No pallor.   Psychiatric: Her speech is normal and behavior is normal.   Nursing note and vitals reviewed.    Dx cervical -declined by pt      Dx shoulder - declined by pt          Assessment/Plan:   1. Shoulder strain, right, initial encounter  Recommend conservative care, rest, ice/heat, work on gentle ROM exercises (sent w/ handout on recommended stretches), OTC tylenol, tramadol for  breakthrough  Return to clinic with lack of resolution or progression of symptoms.  Cautioned regarding potential for sedation with medication.  F/u w/ sports med    - tramadol (ULTRAM) 50 MG Tab; Take 1 Tab by mouth every 8 hours as needed for Moderate Pain or Severe Pain.  Dispense: 15 Tab; Refill: 0  - REFERRAL TO SPORTS MEDICINE    2. Acute cervical myofascial strain, initial encounter    - tramadol (ULTRAM) 50 MG Tab; Take 1 Tab by mouth every 8 hours as needed for Moderate Pain or Severe Pain.  Dispense: 15 Tab; Refill: 0  - REFERRAL TO SPORTS MEDICINE    3. Fall, initial encounter    - tramadol (ULTRAM) 50 MG Tab; Take 1 Tab by mouth every 8 hours as needed for Moderate Pain or Severe Pain.  Dispense: 15 Tab; Refill: 0  - REFERRAL TO SPORTS MEDICINE

## 2017-08-25 ENCOUNTER — TELEPHONE (OUTPATIENT)
Dept: MEDICAL GROUP | Facility: MEDICAL CENTER | Age: 62
End: 2017-08-25

## 2017-08-25 DIAGNOSIS — Z12.39 SCREENING FOR BREAST CANCER: ICD-10-CM

## 2017-08-25 NOTE — TELEPHONE ENCOUNTER
1. Caller Name: megan                       Call Back Number: [phone] (home) [phone] (work)      2. Message: patient would like an updated order for her mammogram, can you place the order   Thanks    3. Patient approves office to leave a detailed voicemail/MyChart message: N\A

## 2017-09-01 ENCOUNTER — OFFICE VISIT (OUTPATIENT)
Dept: URGENT CARE | Facility: CLINIC | Age: 62
End: 2017-09-01
Payer: COMMERCIAL

## 2017-09-01 VITALS
RESPIRATION RATE: 14 BRPM | HEART RATE: 72 BPM | OXYGEN SATURATION: 98 % | WEIGHT: 146 LBS | TEMPERATURE: 97.7 F | BODY MASS INDEX: 24.3 KG/M2 | SYSTOLIC BLOOD PRESSURE: 138 MMHG | DIASTOLIC BLOOD PRESSURE: 84 MMHG

## 2017-09-01 DIAGNOSIS — H10.32 ACUTE BACTERIAL CONJUNCTIVITIS OF LEFT EYE: ICD-10-CM

## 2017-09-01 DIAGNOSIS — S46.911D RIGHT SHOULDER STRAIN, SUBSEQUENT ENCOUNTER: ICD-10-CM

## 2017-09-01 PROCEDURE — 99214 OFFICE O/P EST MOD 30 MIN: CPT | Performed by: NURSE PRACTITIONER

## 2017-09-01 RX ORDER — TRAMADOL HYDROCHLORIDE 50 MG/1
50 TABLET ORAL EVERY 4 HOURS PRN
Qty: 15 TAB | Refills: 0 | Status: SHIPPED | OUTPATIENT
Start: 2017-09-01 | End: 2017-10-11 | Stop reason: SDUPTHER

## 2017-09-01 RX ORDER — SULFACETAMIDE SODIUM 100 MG/ML
1 SOLUTION/ DROPS OPHTHALMIC
Qty: 1 BOTTLE | Refills: 0 | Status: SHIPPED | OUTPATIENT
Start: 2017-09-01

## 2017-09-01 ASSESSMENT — ENCOUNTER SYMPTOMS
MYALGIAS: 0
VOMITING: 0
EYE REDNESS: 1
EYE DISCHARGE: 1
DOUBLE VISION: 0
BLURRED VISION: 0
NAUSEA: 0
HEADACHES: 0
CHILLS: 0
FEVER: 0
PHOTOPHOBIA: 0
ABDOMINAL PAIN: 0
EYE PAIN: 0

## 2017-09-01 NOTE — PROGRESS NOTES
Subjective:      Sujata Upton is a 62 y.o. female who presents with Eye Problem (Left eye pain and drainage X 1 week )            Medications, Allergies and Prior Medical Hx reviewed and updated in [facility].with patient/family today           Review of Systems   Constitutional: Negative for chills and fever.   Eyes: Positive for discharge and redness. Negative for blurred vision, double vision, photophobia and pain.   Gastrointestinal: Negative for abdominal pain, nausea and vomiting.   Musculoskeletal: Positive for joint pain. Negative for myalgias.   Neurological: Negative for headaches.          Objective:     /84   Pulse 72   Temp 36.5 °C (97.7 °F)   Resp 14   Wt 66.2 kg (146 lb)   SpO2 98%   BMI 24.30 kg/m²      Physical Exam   Constitutional: She appears well-developed and well-nourished. No distress.   HENT:   Head: Normocephalic and atraumatic.   Eyes: EOM are normal. Pupils are equal, round, and reactive to light. Right eye exhibits no chemosis, no discharge, no exudate and no hordeolum. No foreign body present in the right eye. Left eye exhibits discharge. Left eye exhibits no chemosis, no exudate and no hordeolum. No foreign body present in the left eye. Right conjunctiva is not injected. Right conjunctiva has no hemorrhage. Left conjunctiva is injected. Left conjunctiva has no hemorrhage.       Neck: Neck supple.   Cardiovascular: Normal rate.    Pulmonary/Chest: Effort normal. No respiratory distress.   Musculoskeletal:        Right shoulder: She exhibits decreased range of motion, tenderness and pain. She exhibits no bony tenderness, no swelling, no effusion, no crepitus and no deformity.        Arms:  Neurological: She is alert.   Awake, alert, answering questions appropriately, moving all extremeties   Skin: Skin is warm and dry. No rash noted.   Psychiatric: She has a normal mood and affect. Her behavior is normal.               Assessment/Plan:       1. Acute bacterial conjunctivitis  of left eye  sulfacetamide (SULAMYD) 10 % Solution   2. Right shoulder strain, subsequent encounter  tramadol (ULTRAM) 50 MG Tab       Warm compresses to eyes, good hand washing, change towels, washcloths, pillow cases, and eye makeup  Do not wear contacts until sx resolve  Pt will go to the ER for worsening or changing symptoms as discussed, pain, pain with eye movement, redness of eyelids or surrounding area, or vision changes  Follow-up with your primary care provider or return here if not improving in 2-3 days   Discharge instructions discussed with pt/family who verbalize understanding and agreement with poc        Do not drink alcohol or operate machinery with this medication  Pt reviewed on [facility] Aware,  no remarkable controlled substance prescription documentation noted    Do not drink alcohol or operate machinery with this medication  Pt reviewed on [facility] Aware, pt has relatively high score for controlled substance limited tramadol  prescription documentation noted    Rest, Ice, Elevation, Ibuprofen,   Pt will go to the ER for worsening or changing symptoms as discussed,  Follow-up with orthopedics at the next available appt.  Follow-up with your primary care provider or return here if not improving in 7 days   Discharge instructions discussed with pt/family who verbalize understanding and agreement with poc

## 2017-09-11 RX ORDER — ESZOPICLONE 2 MG/1
TABLET, FILM COATED ORAL
Qty: 30 TAB | Refills: 0 | Status: SHIPPED | OUTPATIENT
Start: 2017-09-11 | End: 2017-10-09 | Stop reason: SDUPTHER

## 2017-10-05 ENCOUNTER — IMMUNIZATION (OUTPATIENT)
Dept: OCCUPATIONAL MEDICINE | Facility: CLINIC | Age: 62
End: 2017-10-05

## 2017-10-05 DIAGNOSIS — Z23 NEED FOR VACCINATION: ICD-10-CM

## 2017-10-05 PROCEDURE — 90686 IIV4 VACC NO PRSV 0.5 ML IM: CPT | Performed by: PREVENTIVE MEDICINE

## 2017-10-09 DIAGNOSIS — G44.039 EPISODIC PAROXYSMAL HEMICRANIA, NOT INTRACTABLE: ICD-10-CM

## 2017-10-10 RX ORDER — BUTALBITAL, ACETAMINOPHEN AND CAFFEINE 50; 325; 40 MG/1; MG/1; MG/1
TABLET ORAL
Qty: 60 TAB | Refills: 0 | Status: SHIPPED | OUTPATIENT
Start: 2017-10-10 | End: 2018-01-06 | Stop reason: SDUPTHER

## 2017-10-10 RX ORDER — ESZOPICLONE 2 MG/1
TABLET, FILM COATED ORAL
Qty: 30 TAB | Refills: 0 | Status: SHIPPED | OUTPATIENT
Start: 2017-10-10 | End: 2017-11-17 | Stop reason: SDUPTHER

## 2017-10-11 DIAGNOSIS — S46.911D RIGHT SHOULDER STRAIN, SUBSEQUENT ENCOUNTER: ICD-10-CM

## 2017-10-11 RX ORDER — TRAMADOL HYDROCHLORIDE 50 MG/1
50 TABLET ORAL EVERY 4 HOURS PRN
Qty: 30 TAB | Refills: 0 | Status: SHIPPED
Start: 2017-10-11 | End: 2020-03-02

## 2017-10-23 ENCOUNTER — HOSPITAL ENCOUNTER (OUTPATIENT)
Dept: RADIOLOGY | Facility: MEDICAL CENTER | Age: 62
End: 2017-10-23
Attending: INTERNAL MEDICINE
Payer: COMMERCIAL

## 2017-10-23 DIAGNOSIS — N64.59 ABNORMAL BREAST EXAM: ICD-10-CM

## 2017-10-23 DIAGNOSIS — N95.9 MENOPAUSAL AND POSTMENOPAUSAL DISORDER: ICD-10-CM

## 2017-10-23 PROCEDURE — 77080 DXA BONE DENSITY AXIAL: CPT

## 2017-10-23 PROCEDURE — G0279 TOMOSYNTHESIS, MAMMO: HCPCS

## 2017-10-30 ENCOUNTER — TELEPHONE (OUTPATIENT)
Dept: MEDICAL GROUP | Facility: MEDICAL CENTER | Age: 62
End: 2017-10-30

## 2017-10-31 NOTE — TELEPHONE ENCOUNTER
Bone density results reviewed in detail with patient. We reviewed various options. She will work more on exercise and get plenty of vitamin D. At her next appointment we will again discuss Fosamax.

## 2017-11-17 RX ORDER — ESZOPICLONE 2 MG/1
TABLET, FILM COATED ORAL
Qty: 30 TAB | Refills: 0 | Status: SHIPPED | OUTPATIENT
Start: 2017-11-17 | End: 2017-12-25 | Stop reason: SDUPTHER

## 2017-11-17 RX ORDER — ZOLPIDEM TARTRATE 5 MG/1
TABLET ORAL
Qty: 30 TAB | Refills: 0 | Status: SHIPPED | OUTPATIENT
Start: 2017-11-17 | End: 2017-12-26

## 2017-11-20 ENCOUNTER — TELEPHONE (OUTPATIENT)
Dept: MEDICAL GROUP | Facility: MEDICAL CENTER | Age: 62
End: 2017-11-20

## 2017-11-20 DIAGNOSIS — L30.9 ECZEMA, UNSPECIFIED TYPE: ICD-10-CM

## 2017-11-20 NOTE — TELEPHONE ENCOUNTER
1. Caller Name: Sujata                      Call Back Number: [phone] (home) [phone] (work)      2. Message: patient needs to get an updated referral to derm to get an injection for eczema       3. Patient approves office to leave a detailed voicemail/MyChart message: N\A

## 2017-11-30 ENCOUNTER — TELEPHONE (OUTPATIENT)
Dept: MEDICAL GROUP | Facility: MEDICAL CENTER | Age: 62
End: 2017-11-30

## 2017-11-30 DIAGNOSIS — L30.9 ECZEMA, UNSPECIFIED TYPE: ICD-10-CM

## 2017-11-30 NOTE — TELEPHONE ENCOUNTER
1. Caller Name: Sujata                       Call Back Number: [phone] (home) [phone] (work)      2. Message: please resubmit referral to derm, patient goes to [facility]     3. Patient approves office to leave a detailed voicemail/MyChart message: N\A

## 2017-12-25 DIAGNOSIS — G47.00 INSOMNIA, UNSPECIFIED TYPE: ICD-10-CM

## 2017-12-26 RX ORDER — ESZOPICLONE 2 MG/1
TABLET, FILM COATED ORAL
Qty: 30 TAB | Refills: 0 | Status: SHIPPED | OUTPATIENT
Start: 2017-12-26 | End: 2018-01-06 | Stop reason: SDUPTHER

## 2018-01-06 DIAGNOSIS — G47.00 INSOMNIA, UNSPECIFIED TYPE: ICD-10-CM

## 2018-01-06 DIAGNOSIS — G44.039 EPISODIC PAROXYSMAL HEMICRANIA, NOT INTRACTABLE: ICD-10-CM

## 2018-01-08 RX ORDER — BUTALBITAL, ACETAMINOPHEN AND CAFFEINE 50; 325; 40 MG/1; MG/1; MG/1
TABLET ORAL
Qty: 60 TAB | Refills: 0 | Status: SHIPPED | OUTPATIENT
Start: 2018-01-08 | End: 2018-01-09 | Stop reason: SDUPTHER

## 2018-01-08 RX ORDER — ESZOPICLONE 2 MG/1
TABLET, FILM COATED ORAL
Qty: 30 TAB | Refills: 0 | Status: SHIPPED | OUTPATIENT
Start: 2018-01-08 | End: 2018-01-09 | Stop reason: SDUPTHER

## 2018-01-09 DIAGNOSIS — G47.00 INSOMNIA, UNSPECIFIED TYPE: ICD-10-CM

## 2018-01-09 DIAGNOSIS — G44.039 EPISODIC PAROXYSMAL HEMICRANIA, NOT INTRACTABLE: ICD-10-CM

## 2018-01-09 RX ORDER — ESZOPICLONE 2 MG/1
TABLET, FILM COATED ORAL
Qty: 30 TAB | Refills: 0 | Status: SHIPPED | OUTPATIENT
Start: 2018-01-09 | End: 2018-02-04 | Stop reason: SDUPTHER

## 2018-01-09 RX ORDER — BUTALBITAL, ACETAMINOPHEN AND CAFFEINE 50; 325; 40 MG/1; MG/1; MG/1
TABLET ORAL
Qty: 60 TAB | Refills: 0 | Status: SHIPPED | OUTPATIENT
Start: 2018-01-09 | End: 2018-02-06 | Stop reason: SDUPTHER

## 2018-01-19 ENCOUNTER — APPOINTMENT (RX ONLY)
Dept: URBAN - METROPOLITAN AREA CLINIC 20 | Facility: CLINIC | Age: 63
Setting detail: DERMATOLOGY
End: 2018-01-19

## 2018-01-19 DIAGNOSIS — L57.0 ACTINIC KERATOSIS: ICD-10-CM

## 2018-01-19 DIAGNOSIS — L81.4 OTHER MELANIN HYPERPIGMENTATION: ICD-10-CM

## 2018-01-19 DIAGNOSIS — L20.89 OTHER ATOPIC DERMATITIS: ICD-10-CM

## 2018-01-19 PROCEDURE — 17000 DESTRUCT PREMALG LESION: CPT

## 2018-01-19 PROCEDURE — ? COUNSELING

## 2018-01-19 PROCEDURE — 99213 OFFICE O/P EST LOW 20 MIN: CPT | Mod: 25

## 2018-01-19 PROCEDURE — 96372 THER/PROPH/DIAG INJ SC/IM: CPT | Mod: 59

## 2018-01-19 PROCEDURE — ? LIQUID NITROGEN

## 2018-01-19 PROCEDURE — ? INTRAMUSCULAR KENALOG

## 2018-01-19 PROCEDURE — ? PATIENT SPECIFIC COUNSELING

## 2018-01-19 ASSESSMENT — LOCATION ZONE DERM
LOCATION ZONE: TRUNK
LOCATION ZONE: FACE
LOCATION ZONE: LIP

## 2018-01-19 ASSESSMENT — LOCATION SIMPLE DESCRIPTION DERM
LOCATION SIMPLE: RIGHT BUTTOCK
LOCATION SIMPLE: LEFT CHEEK
LOCATION SIMPLE: LEFT LIP

## 2018-01-19 ASSESSMENT — LOCATION DETAILED DESCRIPTION DERM
LOCATION DETAILED: RIGHT BUTTOCK
LOCATION DETAILED: LEFT UPPER CUTANEOUS LIP
LOCATION DETAILED: LEFT CENTRAL MALAR CHEEK

## 2018-01-19 NOTE — PROCEDURE: INTRAMUSCULAR KENALOG
Consent: The risks of atrophy were reviewed with the patient.
Concentration (Mg/Ml): 40.0
Total Volume (Ccs): 1.5
Administered By (Optional): Colette Priec MA
Detail Level: None
Kenalog Preparation: kenalog

## 2018-01-19 NOTE — PROCEDURE: LIQUID NITROGEN
Detail Level: Simple
Number Of Freeze-Thaw Cycles: 2 freeze-thaw cycles
Render Post-Care Instructions In Note?: no
Post-Care Instructions: I reviewed with the patient in detail post-care instructions. Patient is to wear sunprotection, and avoid picking at any of the treated lesions. Pt may apply Vaseline to crusted or scabbing areas.
Consent: The patient's consent was obtained including but not limited to risks of crusting, scabbing, blistering, scarring, darker or lighter pigmentary change, recurrence, incomplete removal and infection.
Duration Of Freeze Thaw-Cycle (Seconds): 3
Aperture Size (Optional): C

## 2018-01-19 NOTE — PROCEDURE: PATIENT SPECIFIC COUNSELING
Pt has been to the allergist in the past and has been patch tested. States had no reactions and they told her it was stress linked.\\nAdvised patient to begin an otc antihistamine to help with itching. Can overlap a non drowsy with Benadryl at night.
Detail Level: Zone

## 2018-02-06 DIAGNOSIS — G44.039 EPISODIC PAROXYSMAL HEMICRANIA, NOT INTRACTABLE: ICD-10-CM

## 2018-02-07 RX ORDER — BUTALBITAL, ACETAMINOPHEN AND CAFFEINE 50; 325; 40 MG/1; MG/1; MG/1
TABLET ORAL
Qty: 60 TAB | Refills: 0 | Status: SHIPPED | OUTPATIENT
Start: 2018-02-07 | End: 2018-03-29 | Stop reason: SDUPTHER

## 2018-03-08 DIAGNOSIS — G47.00 INSOMNIA, UNSPECIFIED TYPE: ICD-10-CM

## 2018-03-09 RX ORDER — ESZOPICLONE 2 MG/1
TABLET, FILM COATED ORAL
Qty: 30 TAB | Refills: 0 | Status: SHIPPED | OUTPATIENT
Start: 2018-03-09 | End: 2018-04-08

## 2018-03-29 DIAGNOSIS — F51.01 PRIMARY INSOMNIA: ICD-10-CM

## 2018-03-29 DIAGNOSIS — G44.039 EPISODIC PAROXYSMAL HEMICRANIA, NOT INTRACTABLE: ICD-10-CM

## 2018-03-30 RX ORDER — ZOLPIDEM TARTRATE 5 MG/1
TABLET ORAL
Qty: 30 TAB | Refills: 0 | Status: SHIPPED
Start: 2018-03-30 | End: 2018-04-29

## 2018-03-30 RX ORDER — BUTALBITAL, ACETAMINOPHEN AND CAFFEINE 50; 325; 40 MG/1; MG/1; MG/1
TABLET ORAL
Qty: 60 TAB | Refills: 0 | Status: SHIPPED | OUTPATIENT
Start: 2018-03-30 | End: 2018-06-13 | Stop reason: SDUPTHER

## 2018-04-11 DIAGNOSIS — F51.01 PRIMARY INSOMNIA: ICD-10-CM

## 2018-04-11 RX ORDER — ESZOPICLONE 2 MG/1
TABLET, FILM COATED ORAL
Qty: 30 TAB | Refills: 0 | Status: SHIPPED | OUTPATIENT
Start: 2018-04-11 | End: 2018-05-10 | Stop reason: SDUPTHER

## 2018-05-07 RX ORDER — ESTRADIOL 0.5 MG/1
0.5 TABLET ORAL
Qty: 90 TAB | Refills: 3 | Status: SHIPPED | OUTPATIENT
Start: 2018-05-07 | End: 2019-03-19 | Stop reason: SDUPTHER

## 2018-05-10 DIAGNOSIS — F51.01 PRIMARY INSOMNIA: ICD-10-CM

## 2018-05-10 RX ORDER — ESZOPICLONE 2 MG/1
TABLET, FILM COATED ORAL
Qty: 30 TAB | Refills: 0 | Status: SHIPPED | OUTPATIENT
Start: 2018-05-10 | End: 2018-06-09

## 2018-05-23 ENCOUNTER — HOSPITAL ENCOUNTER (OUTPATIENT)
Dept: LAB | Facility: MEDICAL CENTER | Age: 63
End: 2018-05-23
Payer: COMMERCIAL

## 2018-05-23 LAB
BDY FAT % MEASURED: 31 %
BP DIAS: 70 MMHG
BP SYS: 138 MMHG
CHOLEST SERPL-MCNC: 185 MG/DL (ref 100–199)
DIABETES HTDIA: NO
EVENT NAME HTEVT: NORMAL
FASTING HTFAS: YES
GLUCOSE SERPL-MCNC: 94 MG/DL (ref 65–99)
HDLC SERPL-MCNC: 61 MG/DL
HYPERTENSION HTHYP: YES
LDLC SERPL CALC-MCNC: 84 MG/DL
SCREENING LOC CITY HTCIT: NORMAL
SCREENING LOC STATE HTSTA: NORMAL
SCREENING LOCATION HTLOC: NORMAL
SMOKING HTSMO: NO
SUBSCRIBER ID HTSID: NORMAL
TRIGL SERPL-MCNC: 199 MG/DL (ref 0–149)

## 2018-05-23 PROCEDURE — 80061 LIPID PANEL: CPT

## 2018-05-23 PROCEDURE — 36415 COLL VENOUS BLD VENIPUNCTURE: CPT

## 2018-05-23 PROCEDURE — S5190 WELLNESS ASSESSMENT BY NONPH: HCPCS

## 2018-05-23 PROCEDURE — 82947 ASSAY GLUCOSE BLOOD QUANT: CPT

## 2018-06-20 DIAGNOSIS — G44.039 EPISODIC PAROXYSMAL HEMICRANIA, NOT INTRACTABLE: ICD-10-CM

## 2018-06-29 ENCOUNTER — OFFICE VISIT (OUTPATIENT)
Dept: MEDICAL GROUP | Facility: MEDICAL CENTER | Age: 63
End: 2018-06-29
Payer: COMMERCIAL

## 2018-06-29 VITALS
RESPIRATION RATE: 16 BRPM | OXYGEN SATURATION: 95 % | BODY MASS INDEX: 24.83 KG/M2 | HEART RATE: 69 BPM | TEMPERATURE: 98.4 F | SYSTOLIC BLOOD PRESSURE: 128 MMHG | HEIGHT: 65 IN | WEIGHT: 149 LBS | DIASTOLIC BLOOD PRESSURE: 80 MMHG

## 2018-06-29 DIAGNOSIS — K21.9 GASTROESOPHAGEAL REFLUX DISEASE WITHOUT ESOPHAGITIS: ICD-10-CM

## 2018-06-29 DIAGNOSIS — G47.9 DISTURBANCE IN SLEEP BEHAVIOR: ICD-10-CM

## 2018-06-29 DIAGNOSIS — G47.00 INSOMNIA, UNSPECIFIED TYPE: ICD-10-CM

## 2018-06-29 DIAGNOSIS — E78.5 HYPERLIPIDEMIA, UNSPECIFIED HYPERLIPIDEMIA TYPE: ICD-10-CM

## 2018-06-29 DIAGNOSIS — I10 HTN (HYPERTENSION), BENIGN: ICD-10-CM

## 2018-06-29 PROCEDURE — 99214 OFFICE O/P EST MOD 30 MIN: CPT | Performed by: INTERNAL MEDICINE

## 2018-06-29 RX ORDER — BUTALBITAL, ACETAMINOPHEN AND CAFFEINE 50; 325; 40 MG/1; MG/1; MG/1
TABLET ORAL
Qty: 60 TAB | Refills: 0 | Status: SHIPPED | OUTPATIENT
Start: 2018-06-29 | End: 2018-07-29

## 2018-06-29 RX ORDER — ZOLPIDEM TARTRATE 5 MG/1
5 TABLET ORAL NIGHTLY PRN
Qty: 30 TAB | Refills: 0 | Status: SHIPPED | OUTPATIENT
Start: 2018-07-30 | End: 2018-06-29 | Stop reason: SDUPTHER

## 2018-06-29 RX ORDER — ZOLPIDEM TARTRATE 5 MG/1
5 TABLET ORAL NIGHTLY PRN
Qty: 30 TAB | Refills: 0 | Status: SHIPPED | OUTPATIENT
Start: 2018-06-29 | End: 2018-06-29 | Stop reason: SDUPTHER

## 2018-06-29 RX ORDER — ZOLPIDEM TARTRATE 5 MG/1
5 TABLET ORAL NIGHTLY PRN
Qty: 30 TAB | Refills: 0 | Status: SHIPPED | OUTPATIENT
Start: 2018-08-30 | End: 2018-07-27 | Stop reason: SDUPTHER

## 2018-06-29 RX ORDER — LOSARTAN POTASSIUM 50 MG/1
50 TABLET ORAL
Qty: 90 TAB | Refills: 3 | Status: SHIPPED | OUTPATIENT
Start: 2018-06-29 | End: 2018-12-17 | Stop reason: SDUPTHER

## 2018-06-29 NOTE — PROGRESS NOTES
Subjective:     Chief Complaint   Patient presents with   • Medication Refill     Sujata Upton is a 63 y.o. female here today for follow-up of her insomnia as well as gastroesophageal reflux and hypertension and hyperlipidemia.    Disturbance in sleep behavior  Her sleep problem remains essentially unchanged.  She is dependent on sleeping medications.  She currently is on Ambien which seems to work fairly well.  She from time to time will switch to Lunesta.  She remains under a moderate amount of stress but seems to be handling it fairly well.  She continues taking Celexa.    Esophageal reflux  Gastroesophageal reflux is under fairly good control primarily with diet.    HTN (hypertension), benign  She is on losartan 25 mg daily.  She finds that often at home her blood pressure is around 150/85.  She denies lightheadedness.  She tries to follow a low-salt diet with some success.  Blood pressure when she first came in today was elevated but repeat check was in normal range.    Hyperlipidemia  Most recent lipid panel shows cholesterol 185, triglycerides 199, HDL 61, LDL 84.  She tries to follow appropriate diet.  She is not significantly overweight.       Diagnoses of Insomnia, unspecified type, HTN (hypertension), benign, Hyperlipidemia, unspecified hyperlipidemia type, Disturbance in sleep behavior, and Gastroesophageal reflux disease without esophagitis were pertinent to this visit.    Allergies: Pcn [penicillins]  Current medicines (including changes today)  Current Outpatient Prescriptions   Medication Sig Dispense Refill   • [START ON 8/30/2018] zolpidem (AMBIEN) 5 MG Tab Take 1 Tab by mouth at bedtime as needed for Sleep for up to 30 days. 30 Tab 0   • losartan (COZAAR) 50 MG Tab Take 1 Tab by mouth every day for 90 days. 90 Tab 3   • Eszopiclone 2 MG Tab TAKE 1 TABLET ORALLY AT BEDTIME AS NEEDED 30 Tab 0   • estradiol (ESTRACE) 0.5 MG tablet TAKE 1 TAB BY MOUTH EVERY DAY. 90 Tab 3   • sulfacetamide  "(SULAMYD) 10 % Solution Place 1 Drop in left eye every 3 hours. 1 Bottle 0   • citalopram (CELEXA) 20 MG Tab Take 1 Tab by mouth every day. 30 Tab 11   • ibuprofen (MOTRIN) 200 MG Tab Take 200 mg by mouth every 6 hours as needed.     • therapeutic multivitamin-minerals (THERAGRAN-M) Tab Take 1 Tab by mouth every day.     • Calcium Carbonate-Vit D-Min (CALCIUM 1200 PO) Take  by mouth.     • acetaminophen/caffeine/butalbital 325-40-50 mg (FIORICET) -40 MG Tab TAKE 1 TABLET BY MOUTH EVERY 6 HOURS AS NEEDED FOR HEADACHE 60 Tab 0   • tramadol (ULTRAM) 50 MG Tab Take 1 Tab by mouth every four hours as needed for Moderate Pain or Severe Pain. 30 Tab 0   • trazodone (DESYREL) 50 MG Tab Take 1 Tab by mouth at bedtime as needed for Sleep. 30 Tab 3   • azithromycin (ZITHROMAX) 250 MG Tab 2 TABS ON DAY 1, 1 TAB ON DAYS 2-5. 6 Tab 1     No current facility-administered medications for this visit.        She  has a past medical history of Anesthesia; Esophageal reflux (1/9/2015); Headache (1/9/2015); Hot flushes, perimenopausal (1/9/2015); HTN (hypertension), benign (1/9/2015); Neck mass (12/2/2016); Pain (7/1); Routine general medical examination at a health care facility (8/16/2016); Sleep disturbance, unspecified (1/9/2015); and Stress reaction (8/18/2017). She also has no past medical history of Breast cancer (HCC).    ROS    Patient denies significant change in strength, weight or appetite.  No significant lightheadedness or headaches.  No change in vision, hearing, or swallowing.  No new dyspnea, coughing, chest pain, or palpitations.  No indigestion, abdominal pain, or change in bowel habits.  No change in urinating.  No new ankle swelling.       Objective:     PE:  /80   Pulse 69   Temp 36.9 °C (98.4 °F)   Resp 16   Ht 1.651 m (5' 5\")   Wt 67.6 kg (149 lb)   SpO2 95%   BMI 24.79 kg/m²    Neck is supple without significant lymphadenopathy or masses.  Lungs are clear with normal breath sounds without " wheezes or rales .  Cardiovascular: peripheral circulation is satisfactory, heart sounds are unchanged and unremarkable.  Abdomen is soft, without masses or tenderness, with normal bowel sounds.  Extremities are without significant edema, cyanosis or deformity.      Assessment and Plan:   The following treatment plan was discussed  1. Insomnia, unspecified type  zolpidem (AMBIEN) 5 MG Tab    DISCONTINUED: zolpidem (AMBIEN) 5 MG Tab    DISCONTINUED: zolpidem (AMBIEN) 5 MG Tab    Continue Ambien for now.  She understands the danger of these medications.   2. HTN (hypertension), benign  BASIC METABOLIC PANEL    She will increase losartan to 50 mg daily and continue low-salt diet.   3. Hyperlipidemia, unspecified hyperlipidemia type  LIPID PROFILE    Continue following appropriate diet.   4. Disturbance in sleep behavior      Continue current regimen.  She understands the dangers of these medications.   5. Gastroesophageal reflux disease without esophagitis      We again reviewed appropriate conservative therapy.       Followup: Return in about 3 months (around 9/29/2018) for Short.

## 2018-06-29 NOTE — ASSESSMENT & PLAN NOTE
Most recent lipid panel shows cholesterol 185, triglycerides 199, HDL 61, LDL 84.  She tries to follow appropriate diet.  She is not significantly overweight.

## 2018-06-29 NOTE — ASSESSMENT & PLAN NOTE
She is on losartan 25 mg daily.  She finds that often at home her blood pressure is around 150/85.  She denies lightheadedness.  She tries to follow a low-salt diet with some success.  Blood pressure when she first came in today was elevated but repeat check was in normal range.

## 2018-06-29 NOTE — ASSESSMENT & PLAN NOTE
Her sleep problem remains essentially unchanged.  She is dependent on sleeping medications.  She currently is on Ambien which seems to work fairly well.  She from time to time will switch to Lunesta.  She remains under a moderate amount of stress but seems to be handling it fairly well.  She continues taking Celexa.

## 2018-07-27 DIAGNOSIS — G47.00 INSOMNIA, UNSPECIFIED TYPE: ICD-10-CM

## 2018-07-30 RX ORDER — ZOLPIDEM TARTRATE 5 MG/1
TABLET ORAL
Qty: 30 TAB | Refills: 0 | Status: SHIPPED | OUTPATIENT
Start: 2018-07-30 | End: 2018-12-28 | Stop reason: SDUPTHER

## 2018-09-25 ENCOUNTER — IMMUNIZATION (OUTPATIENT)
Dept: OCCUPATIONAL MEDICINE | Facility: CLINIC | Age: 63
End: 2018-09-25

## 2018-09-25 DIAGNOSIS — Z23 NEED FOR VACCINATION: ICD-10-CM

## 2018-09-25 PROCEDURE — 90686 IIV4 VACC NO PRSV 0.5 ML IM: CPT | Performed by: PREVENTIVE MEDICINE

## 2018-10-01 ENCOUNTER — HOSPITAL ENCOUNTER (OUTPATIENT)
Dept: LAB | Facility: MEDICAL CENTER | Age: 63
End: 2018-10-01
Attending: INTERNAL MEDICINE
Payer: COMMERCIAL

## 2018-10-01 ENCOUNTER — OFFICE VISIT (OUTPATIENT)
Dept: MEDICAL GROUP | Facility: MEDICAL CENTER | Age: 63
End: 2018-10-01
Payer: COMMERCIAL

## 2018-10-01 VITALS
OXYGEN SATURATION: 96 % | TEMPERATURE: 98.7 F | SYSTOLIC BLOOD PRESSURE: 122 MMHG | WEIGHT: 145 LBS | HEIGHT: 65 IN | DIASTOLIC BLOOD PRESSURE: 64 MMHG | BODY MASS INDEX: 24.16 KG/M2 | HEART RATE: 86 BPM | RESPIRATION RATE: 16 BRPM

## 2018-10-01 DIAGNOSIS — E78.5 HYPERLIPIDEMIA, UNSPECIFIED HYPERLIPIDEMIA TYPE: ICD-10-CM

## 2018-10-01 DIAGNOSIS — G47.9 DISTURBANCE IN SLEEP BEHAVIOR: ICD-10-CM

## 2018-10-01 DIAGNOSIS — L40.9 PSORIASIS: ICD-10-CM

## 2018-10-01 DIAGNOSIS — K21.9 GASTROESOPHAGEAL REFLUX DISEASE WITHOUT ESOPHAGITIS: ICD-10-CM

## 2018-10-01 DIAGNOSIS — I10 HTN (HYPERTENSION), BENIGN: ICD-10-CM

## 2018-10-01 DIAGNOSIS — Z79.899 MEDICATION MANAGEMENT: ICD-10-CM

## 2018-10-01 LAB
ANION GAP SERPL CALC-SCNC: 7 MMOL/L (ref 0–11.9)
BUN SERPL-MCNC: 11 MG/DL (ref 8–22)
CALCIUM SERPL-MCNC: 9.5 MG/DL (ref 8.5–10.5)
CHLORIDE SERPL-SCNC: 104 MMOL/L (ref 96–112)
CHOLEST SERPL-MCNC: 198 MG/DL (ref 100–199)
CO2 SERPL-SCNC: 28 MMOL/L (ref 20–33)
CREAT SERPL-MCNC: 0.77 MG/DL (ref 0.5–1.4)
GLUCOSE SERPL-MCNC: 99 MG/DL (ref 65–99)
HDLC SERPL-MCNC: 63 MG/DL
LDLC SERPL CALC-MCNC: 85 MG/DL
POTASSIUM SERPL-SCNC: 4.2 MMOL/L (ref 3.6–5.5)
SODIUM SERPL-SCNC: 139 MMOL/L (ref 135–145)
TRIGL SERPL-MCNC: 249 MG/DL (ref 0–149)

## 2018-10-01 PROCEDURE — 80048 BASIC METABOLIC PNL TOTAL CA: CPT

## 2018-10-01 PROCEDURE — 80061 LIPID PANEL: CPT

## 2018-10-01 PROCEDURE — 36415 COLL VENOUS BLD VENIPUNCTURE: CPT

## 2018-10-01 PROCEDURE — 99214 OFFICE O/P EST MOD 30 MIN: CPT | Performed by: INTERNAL MEDICINE

## 2018-10-01 RX ORDER — ESZOPICLONE 2 MG/1
2 TABLET, FILM COATED ORAL
Qty: 30 TAB | Refills: 2 | Status: SHIPPED | OUTPATIENT
Start: 2018-10-01 | End: 2018-12-28

## 2018-10-01 ASSESSMENT — PATIENT HEALTH QUESTIONNAIRE - PHQ9: CLINICAL INTERPRETATION OF PHQ2 SCORE: 0

## 2018-10-01 NOTE — ASSESSMENT & PLAN NOTE
Lipid panel at [facility] showed cholesterol 185, triglycerides 199, HDL 61, LDL 84.  She tries to follow appropriate diet.

## 2018-10-01 NOTE — ASSESSMENT & PLAN NOTE
Psoriasis is affecting her scalp.  She is thus losing a fair amount of hair.  She wonders about trying oh Otesla and will discuss that with Dr. Chung.

## 2018-10-01 NOTE — PROGRESS NOTES
Subjective:     Chief Complaint   Patient presents with   • Follow-Up     Sujata Upton is a 63 y.o. female here today for sleeping pill prescription as well as gastroesophageal reflux and hypertension and hyperlipidemia and psoriasis.    Disturbance in sleep behavior  Her sleep disturbance problem remains essentially unchanged.  She would like to try a Lunesta again.  She understands the potential dangers of these medications.    Esophageal reflux  Gastroesophageal reflux is under good control primarily with diet.    HTN (hypertension), benign  Blood pressure currently is under good control primarily with low-salt diet.    Hyperlipidemia  Lipid panel at [facility] showed cholesterol 185, triglycerides 199, HDL 61, LDL 84.  She tries to follow appropriate diet.    Psoriasis  Psoriasis is affecting her scalp.  She is thus losing a fair amount of hair.  She wonders about trying oh Otesla and will discuss that with Dr. Chung.       Diagnoses of Psoriasis, Medication management, Disturbance in sleep behavior, Hyperlipidemia, unspecified hyperlipidemia type, HTN (hypertension), benign, and Gastroesophageal reflux disease without esophagitis were pertinent to this visit.    Allergies: Pcn [penicillins]  Current medicines (including changes today)  Current Outpatient Prescriptions   Medication Sig Dispense Refill   • Eszopiclone (LUNESTA) 2 MG Tab Take 1 Tab by mouth at bedtime as needed for up to 90 days. 30 Tab 2   • acetaminophen/caffeine/butalbital 325-40-50 mg (FIORICET) -40 MG Tab TAKE 1 TABLET ORALLY EVERY 6 HRS AS NEEDED FOR HEADACHE 60 Tab 0   • estradiol (ESTRACE) 0.5 MG tablet TAKE 1 TAB BY MOUTH EVERY DAY. 90 Tab 3   • tramadol (ULTRAM) 50 MG Tab Take 1 Tab by mouth every four hours as needed for Moderate Pain or Severe Pain. 30 Tab 0   • sulfacetamide (SULAMYD) 10 % Solution Place 1 Drop in left eye every 3 hours. 1 Bottle 0   • citalopram (CELEXA) 20 MG Tab Take 1 Tab by mouth every day. 30  "Tab 11   • trazodone (DESYREL) 50 MG Tab Take 1 Tab by mouth at bedtime as needed for Sleep. 30 Tab 3   • ibuprofen (MOTRIN) 200 MG Tab Take 200 mg by mouth every 6 hours as needed.     • therapeutic multivitamin-minerals (THERAGRAN-M) Tab Take 1 Tab by mouth every day.     • Calcium Carbonate-Vit D-Min (CALCIUM 1200 PO) Take  by mouth.     • azithromycin (ZITHROMAX) 250 MG Tab 2 TABS ON DAY 1, 1 TAB ON DAYS 2-5. 6 Tab 1     No current facility-administered medications for this visit.        She  has a past medical history of Anesthesia; Esophageal reflux (1/9/2015); Headache (1/9/2015); Hot flushes, perimenopausal (1/9/2015); HTN (hypertension), benign (1/9/2015); Neck mass (12/2/2016); Pain (7/1); Psoriasis (10/1/2018); Routine general medical examination at a health care facility (8/16/2016); Sleep disturbance, unspecified (1/9/2015); and Stress reaction (8/18/2017). She also has no past medical history of Breast cancer (HCC).    ROS    Patient denies significant change in strength, weight or appetite.  No significant lightheadedness or headaches.  No change in vision, hearing, or swallowing.  No new dyspnea, coughing, chest pain, or palpitations.  No indigestion, abdominal pain, or change in bowel habits.  No change in urinating.  No new ankle swelling.       Objective:     PE:  /64 (BP Location: Left arm)   Pulse 86   Temp 37.1 °C (98.7 °F)   Resp 16   Ht 1.651 m (5' 5\")   Wt 65.8 kg (145 lb)   SpO2 96%   BMI 24.13 kg/m²    Neck is supple without significant lymphadenopathy or masses.  Lungs are clear with normal breath sounds without wheezes or rales .  Cardiovascular: peripheral circulation is satisfactory, heart sounds are unchanged and unremarkable.  Abdomen is soft, without masses or tenderness, with normal bowel sounds.  Extremities are without significant edema, cyanosis or deformity.      Assessment and Plan:   The following treatment plan was discussed  1. Psoriasis  REFERRAL TO " DERMATOLOGY    Follow-up closely with Dr. Chung.   2. Medication management     3. Disturbance in sleep behavior  Eszopiclone (LUNESTA) 2 MG Tab    We reviewed appropriate use of Lunesta and potential dangers and side effects.  I did send in a prescription for that for her.   4. Hyperlipidemia, unspecified hyperlipidemia type  LIPID PROFILE    We reviewed in we will check another lipid panel at next visit.  Consider medication if not better.  Some detail appropriate diet.   5. HTN (hypertension), benign      Continue low-salt diet.  Check blood pressure at home and report to us.   6. Gastroesophageal reflux disease without esophagitis      Try Pepcid Complete for breakthrough discomfort.       Followup: Return in about 3 months (around 1/1/2019) for Short.

## 2018-10-01 NOTE — ASSESSMENT & PLAN NOTE
Her sleep disturbance problem remains essentially unchanged.  She would like to try a Lunesta again.  She understands the potential dangers of these medications.

## 2018-10-02 DIAGNOSIS — E78.5 HYPERLIPIDEMIA, UNSPECIFIED HYPERLIPIDEMIA TYPE: ICD-10-CM

## 2018-11-28 ENCOUNTER — HOSPITAL ENCOUNTER (OUTPATIENT)
Dept: RADIOLOGY | Facility: MEDICAL CENTER | Age: 63
End: 2018-11-28
Attending: INTERNAL MEDICINE
Payer: COMMERCIAL

## 2018-11-28 DIAGNOSIS — Z12.39 SCREENING BREAST EXAMINATION: ICD-10-CM

## 2018-11-28 PROCEDURE — 77067 SCR MAMMO BI INCL CAD: CPT

## 2018-11-28 PROCEDURE — 4410555 US-SCREENING WHOLE BREAST (3D SCREENING)

## 2018-12-17 RX ORDER — LOSARTAN POTASSIUM 50 MG/1
50 TABLET ORAL
Qty: 90 TAB | Refills: 3 | Status: SHIPPED | OUTPATIENT
Start: 2018-12-17 | End: 2018-12-19 | Stop reason: SDUPTHER

## 2018-12-19 RX ORDER — LOSARTAN POTASSIUM 50 MG/1
50 TABLET ORAL
Qty: 90 TAB | Refills: 3 | Status: SHIPPED | OUTPATIENT
Start: 2018-12-19 | End: 2019-03-19

## 2018-12-28 ENCOUNTER — OFFICE VISIT (OUTPATIENT)
Dept: MEDICAL GROUP | Facility: MEDICAL CENTER | Age: 63
End: 2018-12-28
Payer: COMMERCIAL

## 2018-12-28 VITALS
WEIGHT: 152 LBS | DIASTOLIC BLOOD PRESSURE: 80 MMHG | OXYGEN SATURATION: 97 % | HEART RATE: 64 BPM | TEMPERATURE: 97.7 F | BODY MASS INDEX: 25.33 KG/M2 | SYSTOLIC BLOOD PRESSURE: 120 MMHG | HEIGHT: 65 IN

## 2018-12-28 DIAGNOSIS — K21.9 GASTROESOPHAGEAL REFLUX DISEASE WITHOUT ESOPHAGITIS: ICD-10-CM

## 2018-12-28 DIAGNOSIS — I10 HTN (HYPERTENSION), BENIGN: ICD-10-CM

## 2018-12-28 DIAGNOSIS — Z23 NEED FOR STREPTOCOCCUS PNEUMONIAE VACCINATION: ICD-10-CM

## 2018-12-28 DIAGNOSIS — L40.9 PSORIASIS: ICD-10-CM

## 2018-12-28 DIAGNOSIS — E78.5 HYPERLIPIDEMIA, UNSPECIFIED HYPERLIPIDEMIA TYPE: ICD-10-CM

## 2018-12-28 DIAGNOSIS — G47.9 DISTURBANCE IN SLEEP BEHAVIOR: ICD-10-CM

## 2018-12-28 DIAGNOSIS — G47.00 INSOMNIA, UNSPECIFIED TYPE: ICD-10-CM

## 2018-12-28 PROCEDURE — 99214 OFFICE O/P EST MOD 30 MIN: CPT | Mod: 25 | Performed by: INTERNAL MEDICINE

## 2018-12-28 PROCEDURE — 90670 PCV13 VACCINE IM: CPT | Performed by: INTERNAL MEDICINE

## 2018-12-28 PROCEDURE — 90471 IMMUNIZATION ADMIN: CPT | Performed by: INTERNAL MEDICINE

## 2018-12-28 RX ORDER — ZOLPIDEM TARTRATE 10 MG/1
5 TABLET ORAL NIGHTLY PRN
Qty: 15 TAB | Refills: 0 | Status: SHIPPED | OUTPATIENT
Start: 2018-12-28 | End: 2018-12-28 | Stop reason: SDUPTHER

## 2018-12-28 RX ORDER — ZOLPIDEM TARTRATE 10 MG/1
5 TABLET ORAL NIGHTLY PRN
Qty: 15 TAB | Refills: 0 | Status: SHIPPED | OUTPATIENT
Start: 2019-01-28 | End: 2018-12-28 | Stop reason: SDUPTHER

## 2018-12-28 RX ORDER — ZOLPIDEM TARTRATE 10 MG/1
5 TABLET ORAL NIGHTLY PRN
Qty: 15 TAB | Refills: 0 | Status: SHIPPED | OUTPATIENT
Start: 2019-02-28 | End: 2019-03-30

## 2018-12-28 NOTE — ASSESSMENT & PLAN NOTE
She reports that her psoriasis is gradually getting worse.  She is rather frustrated.  She wants to try a different dermatologist.

## 2018-12-28 NOTE — PROGRESS NOTES
Subjective:     Chief Complaint   Patient presents with   • Follow-Up     3 month      Sujata Upton is a 63 y.o. female here today for follow-up primarily of her sleep disturbance problem as well as her hyperlipidemia and hypertension and psoriasis.    Disturbance in sleep behavior  Her sleep disturbance problem remains about the same.  She wants to try Ambien for the next 3 months then switch back to Lunesta.  She understands the dangers of taking these medications regularly.    Esophageal reflux  Gastroesophageal reflux is under her good control primarily with diet.    HTN (hypertension), benign  Blood pressure is satisfactory on losartan and low-salt diet.  She denies lightheadedness.    Hyperlipidemia  She has not been following her diet carefully at all so did not get a lipid panel drawn.    Psoriasis  She reports that her psoriasis is gradually getting worse.  She is rather frustrated.  She wants to try a different dermatologist.       Diagnoses of Insomnia, unspecified type, Need for Streptococcus pneumoniae vaccination, HTN (hypertension), benign, Disturbance in sleep behavior, Hyperlipidemia, unspecified hyperlipidemia type, Psoriasis, and Gastroesophageal reflux disease without esophagitis were pertinent to this visit.    Allergies: Pcn [penicillins]  Current medicines (including changes today)  Current Outpatient Prescriptions   Medication Sig Dispense Refill   • [START ON 2/28/2019] zolpidem (AMBIEN) 10 MG Tab Take 0.5 Tabs by mouth at bedtime as needed for Sleep for up to 30 days. 15 Tab 0   • losartan (COZAAR) 50 MG Tab Take 1 Tab by mouth every day for 90 days. 90 Tab 3   • Eszopiclone (LUNESTA) 2 MG Tab Take 1 Tab by mouth at bedtime as needed for up to 90 days. 30 Tab 2   • estradiol (ESTRACE) 0.5 MG tablet TAKE 1 TAB BY MOUTH EVERY DAY. 90 Tab 3   • ibuprofen (MOTRIN) 200 MG Tab Take 200 mg by mouth every 6 hours as needed.     • therapeutic multivitamin-minerals (THERAGRAN-M) Tab Take 1 Tab  "by mouth every day.     • Calcium Carbonate-Vit D-Min (CALCIUM 1200 PO) Take  by mouth.     • tramadol (ULTRAM) 50 MG Tab Take 1 Tab by mouth every four hours as needed for Moderate Pain or Severe Pain. 30 Tab 0   • sulfacetamide (SULAMYD) 10 % Solution Place 1 Drop in left eye every 3 hours. 1 Bottle 0   • citalopram (CELEXA) 20 MG Tab Take 1 Tab by mouth every day. 30 Tab 11   • trazodone (DESYREL) 50 MG Tab Take 1 Tab by mouth at bedtime as needed for Sleep. 30 Tab 3   • azithromycin (ZITHROMAX) 250 MG Tab 2 TABS ON DAY 1, 1 TAB ON DAYS 2-5. 6 Tab 1     No current facility-administered medications for this visit.        She  has a past medical history of Anesthesia; Esophageal reflux (1/9/2015); Headache (1/9/2015); Hot flushes, perimenopausal (1/9/2015); HTN (hypertension), benign (1/9/2015); Neck mass (12/2/2016); Pain (7/1); Psoriasis (10/1/2018); Routine general medical examination at a health care facility (8/16/2016); Sleep disturbance, unspecified (1/9/2015); and Stress reaction (8/18/2017). She also has no past medical history of Breast cancer (HCC).  Health Maintenance: She will get Prevnar today.    ROS    Patient denies significant change in strength, weight or appetite.  No significant lightheadedness or headaches.  No change in vision, hearing, or swallowing.  No new dyspnea, coughing, chest pain, or palpitations.  No indigestion, abdominal pain, or change in bowel habits.  No change in urinating.  No new ankle swelling.       Objective:     PE:  /80 (BP Location: Left arm, Patient Position: Sitting)   Pulse 64   Temp 36.5 °C (97.7 °F)   Ht 1.651 m (5' 5\")   Wt 68.9 kg (152 lb)   SpO2 97%   BMI 25.29 kg/m²    Neck is supple without significant lymphadenopathy or masses.  Lungs are clear with normal breath sounds without wheezes or rales .  Cardiovascular: peripheral circulation is satisfactory, heart sounds are unchanged and unremarkable.  Abdomen is soft, without masses or tenderness, " with normal bowel sounds.  Extremities are without significant edema, cyanosis or deformity.      Assessment and Plan:   The following treatment plan was discussed  1. Insomnia, unspecified type  zolpidem (AMBIEN) 10 MG Tab    DISCONTINUED: zolpidem (AMBIEN) 10 MG Tab    DISCONTINUED: zolpidem (AMBIEN) 10 MG Tab    Continue Ambien for now.  She understands the danger of these medications.  She wants to try Lunesta next time, 3 mg each evening.   2. Need for Streptococcus pneumoniae vaccination  Prevnar 13 PCV-13    She will receive Prevnar today.  She can get Pneumovax at next visit.   3. HTN (hypertension), benign      Continue losartan and low-salt diet.   4. Disturbance in sleep behavior      She was given 3 months worth of prescriptions for Ambien.   5. Hyperlipidemia, unspecified hyperlipidemia type      We briefly reviewed appropriate diet.  She again will get a lipid panel at next visit.   6. Psoriasis      I suggested that she try Dr. Rizo.   7. Gastroesophageal reflux disease without esophagitis      We again briefly reviewed appropriate conservative therapy.       Followup: Return in about 3 months (around 3/28/2019) for Short.

## 2018-12-28 NOTE — ASSESSMENT & PLAN NOTE
Her sleep disturbance problem remains about the same.  She wants to try Ambien for the next 3 months then switch back to Lunesta.  She understands the dangers of taking these medications regularly.

## 2019-01-09 ENCOUNTER — TELEPHONE (OUTPATIENT)
Dept: DERMATOLOGY | Facility: IMAGING CENTER | Age: 64
End: 2019-01-09

## 2019-01-09 ENCOUNTER — HOSPITAL ENCOUNTER (OUTPATIENT)
Facility: MEDICAL CENTER | Age: 64
End: 2019-01-09
Attending: DERMATOLOGY
Payer: COMMERCIAL

## 2019-01-09 ENCOUNTER — OFFICE VISIT (OUTPATIENT)
Dept: DERMATOLOGY | Facility: IMAGING CENTER | Age: 64
End: 2019-01-09
Payer: COMMERCIAL

## 2019-01-09 VITALS — HEIGHT: 65 IN | BODY MASS INDEX: 23.99 KG/M2 | WEIGHT: 144 LBS

## 2019-01-09 DIAGNOSIS — L65.9 ALOPECIA: ICD-10-CM

## 2019-01-09 DIAGNOSIS — L98.9 DISEASE OF SKIN AND SUBCUTANEOUS TISSUE: ICD-10-CM

## 2019-01-09 PROCEDURE — 88305 TISSUE EXAM BY PATHOLOGIST: CPT

## 2019-01-09 PROCEDURE — 11104 PUNCH BX SKIN SINGLE LESION: CPT | Performed by: DERMATOLOGY

## 2019-01-09 PROCEDURE — 88312 SPECIAL STAINS GROUP 1: CPT

## 2019-01-09 PROCEDURE — 99203 OFFICE O/P NEW LOW 30 MIN: CPT | Mod: 25 | Performed by: DERMATOLOGY

## 2019-01-09 RX ORDER — TRIAMCINOLONE ACETONIDE 1 MG/G
1 CREAM TOPICAL 2 TIMES DAILY
Qty: 60 G | Refills: 2 | Status: SHIPPED | OUTPATIENT
Start: 2019-01-09

## 2019-01-09 RX ORDER — TACROLIMUS 1 MG/G
1 OINTMENT TOPICAL 2 TIMES DAILY
Qty: 1 TUBE | Refills: 3 | Status: SHIPPED | OUTPATIENT
Start: 2019-01-09

## 2019-01-09 NOTE — PROGRESS NOTES
Dermatology New Patient Visit    Chief Complaint   Patient presents with   • Psoriasis       Subjective:     HPI:   Sujata Upton is a 63 y.o. female presenting for    HPI:possible psoriasis  Scalp , face, under arms  and back   Onset: 30 years ago - started on the legs, then scalp started >10 years ago  Aggravating factors: dying her hair seems to make it worse - but problem started before she started using hair dye  Alleviating factors: none  New creams/topicals: none  New medications (up to last 6 months): no  New travel: no  Other exposures: no   Treatments: was seen by Derm 2017 was given steroid injections, did not help - hydrocortisone for the face - no improvement   Areas are very itchy, can be painful  Notes her hair has been thinning since this started  Denies any joint pain/swelling    History of skin cancer: No  History of blistering/severe sunburns:Yes, Details: youth  Family history of skin cancer:No  Family history of atypical moles:No        Past Medical History:   Diagnosis Date   • Anesthesia     PONV   • Esophageal reflux 1/9/2015   • Headache 1/9/2015   • Hot flushes, perimenopausal 1/9/2015   • HTN (hypertension), benign 1/9/2015   • Neck mass 12/2/2016   • Pain 7/1    left knee   • Psoriasis 10/1/2018   • Routine general medical examination at a health care facility 8/16/2016   • Sleep disturbance, unspecified 1/9/2015   • Stress reaction 8/18/2017       Current Outpatient Prescriptions on File Prior to Visit   Medication Sig Dispense Refill   • [START ON 2/28/2019] zolpidem (AMBIEN) 10 MG Tab Take 0.5 Tabs by mouth at bedtime as needed for Sleep for up to 30 days. 15 Tab 0   • losartan (COZAAR) 50 MG Tab Take 1 Tab by mouth every day for 90 days. 90 Tab 3   • estradiol (ESTRACE) 0.5 MG tablet TAKE 1 TAB BY MOUTH EVERY DAY. 90 Tab 3   • tramadol (ULTRAM) 50 MG Tab Take 1 Tab by mouth every four hours as needed for Moderate Pain or Severe Pain. 30 Tab 0   • sulfacetamide (SULAMYD) 10 %  "Solution Place 1 Drop in left eye every 3 hours. 1 Bottle 0   • citalopram (CELEXA) 20 MG Tab Take 1 Tab by mouth every day. 30 Tab 11   • ibuprofen (MOTRIN) 200 MG Tab Take 200 mg by mouth every 6 hours as needed.     • therapeutic multivitamin-minerals (THERAGRAN-M) Tab Take 1 Tab by mouth every day.     • Calcium Carbonate-Vit D-Min (CALCIUM 1200 PO) Take  by mouth.       No current facility-administered medications on file prior to visit.        Allergies   Allergen Reactions   • Pcn [Penicillins] Anaphylaxis       Family History   Problem Relation Age of Onset   • Cancer Mother         breast cancer   • Heart Disease Mother    • Hypertension Mother    • Dementia Mother    • Diabetes Mother    • Breast Cancer Mother    • Stroke Unknown        Social History     Social History   • Marital status:      Spouse name: N/A   • Number of children: N/A   • Years of education: N/A     Occupational History   • Not on file.     Social History Main Topics   • Smoking status: Never Smoker   • Smokeless tobacco: Never Used   • Alcohol use No   • Drug use: No   • Sexual activity: Yes     Partners: Male     Birth control/ protection: Surgical     Other Topics Concern   • Not on file     Social History Narrative   • No narrative on file       Review of Systems   Constitutional: Negative for chills and fever.   Skin: Positive for itching and rash.   All other systems reviewed and are negative.       Objective:     A focused cutaneous exam was completed including: scalp, hair, ears, face, eyelids, conjunctiva, lips, neck, chest, abdomen, back, left and right upper extremities (including hands/digits and fingernails) with the following pertinent findings listed below. Remaining above-listed examined areas within normal limits / negative for rashes or lesions.    Height 1.651 m (5' 5\"), weight 65.3 kg (144 lb).    Physical Exam   Constitutional: She is oriented to person, place, and time and well-developed, well-nourished, " and in no distress.   HENT:   Head: Normocephalic and atraumatic.       Eyes: Conjunctivae and lids are normal.   Neck: Normal range of motion.   Pulmonary/Chest: Effort normal.   Neurological: She is alert and oriented to person, place, and time.   Skin: Skin is warm and dry.        Psychiatric: Mood and affect normal.   Vitals reviewed.      DATA: none applicable to review    Assessment and Plan:     1. Disease of skin and subcutaneous tissue  Comment: appears eczematous on the body, ?contact dermatitis on the scalp vs psoriasis  - educated patient about possible diagnosis, management options, and expectations of treatment  - punch bx today, see below  - dovonex/betamethasone foam BID to scalp  - start triamcinolone 0.1% cream to affected areas of the body twice daily. Patient instructed to avoid face, axilla, and groin area. Side effects discussed, including skin thinning, appearance of stretch marks (striae), easy bruising, telangiectasias, and possible increased hair growth   - instructed to start salicylic acid 3% shampoo 2-3 times/week; to leave on the scalp for at least 5-10 minutes prior to rinsing.  - consider patch testing     Procedure Note   Procedure: Biopsy by punch technique  Location: right parietal scalp  Preoperative diagnosis:above  Risks, benefits and alternatives of procedure discussed and written informed consent obtained. Time out completed. Area of biopsy prepped with alcohol. Anesthesia with 1% lidocaine with epinephrine administered with a 30 gauge needle. 4  mm punch biopsy of site performed. Hemostasis achieved with pressure and 4.0 prolene sutures. Vaseline applied to wound with bandage. Patient tolerated procedure well, and there were no complications.  The pathology specimen was sent to the lab via the staff.  Wound care was discussed with the patient.   - Pathology Specimen; Future    2. Alopecia  - possibly 2/2 above vs female pattern  - discussed minoxidil, but will hold off until  inflammatory component under better control      Followup: Return in about 10 days (around 1/19/2019) for sutures, MD.    Kira Rizo M.D.

## 2019-01-10 LAB — PATHOLOGY CONSULT NOTE: NORMAL

## 2019-01-10 ASSESSMENT — ENCOUNTER SYMPTOMS
CHILLS: 0
FEVER: 0

## 2019-01-17 ENCOUNTER — TELEPHONE (OUTPATIENT)
Dept: DERMATOLOGY | Facility: IMAGING CENTER | Age: 64
End: 2019-01-17

## 2019-01-17 NOTE — TELEPHONE ENCOUNTER
patient called  [facility] is charging her 240.00 for medication after processing through insurance. I called [facility] questioning why? If co payment should only be 50-75 copayment with program and discount card. The  pharmacist is looking in further to see hoe they cam honor program for patient . Awaiting a call back

## 2019-01-17 NOTE — TELEPHONE ENCOUNTER
----- Message from Kira Rizo M.D. sent at 1/16/2019  8:28 PM PST -----  Hi Blanca     I sent Sujata another RX for Enstilar to the [facility] pharmacy in [place] - can you follow up to see that they received it/will be mailing her the new prescription?    Thank you!

## 2019-01-18 ENCOUNTER — NON-PROVIDER VISIT (OUTPATIENT)
Dept: DERMATOLOGY | Facility: IMAGING CENTER | Age: 64
End: 2019-01-18
Payer: COMMERCIAL

## 2019-01-18 NOTE — NON-PROVIDER
Sujata Upton is a 63 y.o. female here for a Non-Provider Visit for Suture Removal.    Sutures were placed by Dr Rizo on date: 1/9/19  Skin is healed: Yes  Provider notified if skin is not healed, or if there is redness, heat, pain, or drainage from incision: N\A  Sutures removed.   Mastisol and steristips are placed: No    Advised to use emollient (vaseline, aquaphor, etc.) as needed, avoid peroxide and antibiotic ointment to reduce irritation.     Path report has been reviewed by provider.  Path report has been reviewed with patient.

## 2019-01-18 NOTE — TELEPHONE ENCOUNTER
Pt came in today for her suture removal and asked if we had heard from the [facility] pharmacy because they had not called her back. I called the pharmacy and they are still waiting to hear from the director whom is off today to talk about getting the Rx for $75. They will call us and the pt when they know something. Pt aware.

## 2019-01-21 NOTE — TELEPHONE ENCOUNTER
[facility] pharmacy called back and stated they could not override copayment . The Discount card dose not work for medicare -part D coverage 65 years or older. The cost would still be 250 .00 please advise

## 2019-01-22 NOTE — TELEPHONE ENCOUNTER
Spoke with patient , she has decided to to pay the 250.00 copayment through [facility].

## 2019-02-07 ENCOUNTER — OFFICE VISIT (OUTPATIENT)
Dept: DERMATOLOGY | Facility: IMAGING CENTER | Age: 64
End: 2019-02-07
Payer: COMMERCIAL

## 2019-02-07 DIAGNOSIS — L23.2 ALLERGIC CONTACT DERMATITIS DUE TO COSMETICS: ICD-10-CM

## 2019-02-07 DIAGNOSIS — L65.9 ALOPECIA: ICD-10-CM

## 2019-02-07 PROCEDURE — 99213 OFFICE O/P EST LOW 20 MIN: CPT | Performed by: DERMATOLOGY

## 2019-02-07 RX ORDER — VALACYCLOVIR HYDROCHLORIDE 500 MG/1
2000 TABLET, FILM COATED ORAL 2 TIMES DAILY
Qty: 8 TAB | Refills: 1 | Status: SHIPPED | OUTPATIENT
Start: 2019-02-07

## 2019-02-07 ASSESSMENT — ENCOUNTER SYMPTOMS
CHILLS: 0
FEVER: 0

## 2019-02-07 NOTE — PROGRESS NOTES
Dermatology Return Patient Visit    Chief Complaint   Patient presents with   • Follow-Up       Subjective:     HPI:   Sujata Upton is a 63 y.o. female presenting for    Follow up, previously suspected psoraisis - bx more c/w eczematous dermatitis, likely contact  Scalp , face, under arms  and back   States scalp is improved with betamethasone/calcipotriene foam - using every other day now  Less itchy, less red  Has not dyed her hair since last visit  Areas on the body improved/resolved with triamcinolone    Onset: 30 years ago - started on the legs, then scalp started >10 years ago  Aggravating factors: dying her hair seems to make it worse - but problem started before she started using hair dye  Alleviating factors: none  New creams/topicals: none  New medications (up to last 6 months): no  New travel: no  Other exposures: no   Treatments: was seen by Derm 2017 was given steroid injections, did not help - hydrocortisone for the face - no improvement   Areas are very itchy, can be painful    Alopecia  Still very concerning to patient  Interested in treating         Past Medical History:   Diagnosis Date   • Anesthesia     PONV   • Esophageal reflux 1/9/2015   • Headache 1/9/2015   • Hot flushes, perimenopausal 1/9/2015   • HTN (hypertension), benign 1/9/2015   • Neck mass 12/2/2016   • Pain 7/1    left knee   • Psoriasis 10/1/2018   • Routine general medical examination at a health care facility 8/16/2016   • Sleep disturbance, unspecified 1/9/2015   • Stress reaction 8/18/2017       Current Outpatient Prescriptions on File Prior to Visit   Medication Sig Dispense Refill   • acetaminophen/caffeine/butalbital 325-40-50 mg (FIORICET) -40 MG Tab TAKE 1 TABLET ORALLY EVERY 6 HRS AS NEEDED FOR HEADACHE START DATE 8/14 END DATE 10/13 R51 60 Tab 0   • Calcipotriene-Betameth Diprop (ENSTILAR) 0.005-0.064 % Foam 1 Application by Apply externally route every bedtime. 1 Can 3   • Salicylic Acid 3 % Shampoo 1  Application by Apply externally route every 48 hours. 1 Bottle 2   • tacrolimus (PROTOPIC) 0.1 % Ointment Apply 1 Application to affected area(s) 2 times a day. To affected area on face 1 Tube 3   • triamcinolone acetonide (KENALOG) 0.1 % Cream Apply 1 Application to affected area(s) 2 times a day. To areas on the BODY 60 g 2   • [START ON 2/28/2019] zolpidem (AMBIEN) 10 MG Tab Take 0.5 Tabs by mouth at bedtime as needed for Sleep for up to 30 days. 15 Tab 0   • losartan (COZAAR) 50 MG Tab Take 1 Tab by mouth every day for 90 days. 90 Tab 3   • estradiol (ESTRACE) 0.5 MG tablet TAKE 1 TAB BY MOUTH EVERY DAY. 90 Tab 3   • tramadol (ULTRAM) 50 MG Tab Take 1 Tab by mouth every four hours as needed for Moderate Pain or Severe Pain. 30 Tab 0   • sulfacetamide (SULAMYD) 10 % Solution Place 1 Drop in left eye every 3 hours. 1 Bottle 0   • citalopram (CELEXA) 20 MG Tab Take 1 Tab by mouth every day. 30 Tab 11   • ibuprofen (MOTRIN) 200 MG Tab Take 200 mg by mouth every 6 hours as needed.     • therapeutic multivitamin-minerals (THERAGRAN-M) Tab Take 1 Tab by mouth every day.     • Calcium Carbonate-Vit D-Min (CALCIUM 1200 PO) Take  by mouth.       No current facility-administered medications on file prior to visit.        Allergies   Allergen Reactions   • Pcn [Penicillins] Anaphylaxis       Family History   Problem Relation Age of Onset   • Cancer Mother         breast cancer   • Heart Disease Mother    • Hypertension Mother    • Dementia Mother    • Diabetes Mother    • Breast Cancer Mother    • Stroke Unknown        Social History     Social History   • Marital status:      Spouse name: N/A   • Number of children: N/A   • Years of education: N/A     Occupational History   • Not on file.     Social History Main Topics   • Smoking status: Never Smoker   • Smokeless tobacco: Never Used   • Alcohol use No   • Drug use: No   • Sexual activity: Yes     Partners: Male     Birth control/ protection: Surgical     Other  Topics Concern   • Not on file     Social History Narrative   • No narrative on file       Review of Systems   Constitutional: Negative for chills and fever.   Skin: Positive for itching and rash.   All other systems reviewed and are negative.       Objective:     A focused cutaneous exam was completed including: scalp, hair, ears, face, eyelids, conjunctiva, lips, neck, chest, abdomen, back, left and right upper extremities (including hands/digits and fingernails) with the following pertinent findings listed below. Remaining above-listed examined areas within normal limits / negative for rashes or lesions.    There were no vitals taken for this visit.    Physical Exam   Constitutional: She is oriented to person, place, and time and well-developed, well-nourished, and in no distress.   HENT:   Head: Normocephalic and atraumatic.       Eyes: Conjunctivae and lids are normal.   Neck: Normal range of motion.   Pulmonary/Chest: Effort normal.   Neurological: She is alert and oriented to person, place, and time.   Skin: Skin is warm and dry.   Psychiatric: Mood and affect normal.       DATA: none applicable to review    Assessment and Plan:     1. Allergic contact dermatitis due to likely hair dye  - educated patient about diagnosis, management options, and expectations of treatment  - continue dovonex/betamethasone foam BID to scalp for now (not ready to decrease)  - Side effects discussed, including skin thinning, appearance of stretch marks (striae), easy bruising, telangiectasias, and possible increased hair growth   - continue salicylic acid 3% shampoo 2-3 times/week; to leave on the scalp for at least 5-10 minutes prior to rinsing.  - REFERRAL TO ALLERGY - patch testing    2. Alopecia  Comment: suspect pattern alopecia vs 2/2 chronic inflammation (contact dermatitis) vs less likely metabolic vs unlikely   - CBC WITH DIFFERENTIAL; Future  - FERRITIN; Future  - TSH WITH REFLEX TO FT4; Future  - VITAMIN D,25 HYDROXY;  Future  - recommend holding off on Rogaine 2/2 ongoing inflammation of the scalp  - not a good candidate for spironolactone given family h/o breast CA, as well as possible medication interactions    Total 30+ minutes face-to-face time spent with patient, with greater than 50% of the total time discussing patient's issues and symptoms as listed above in assessment and plan, as well as managing coordination of care for future evaluation and treatment.    Followup: Return in about 2 months (around 4/7/2019).    Kira Rizo M.D.

## 2019-03-18 ENCOUNTER — TELEPHONE (OUTPATIENT)
Dept: DERMATOLOGY | Facility: IMAGING CENTER | Age: 64
End: 2019-03-18

## 2019-03-18 RX ORDER — BETAMETHASONE DIPROPIONATE 0.05 %
1 GEL (GRAM) TOPICAL DAILY
Qty: 50 G | Refills: 2 | Status: SHIPPED | OUTPATIENT
Start: 2019-03-18

## 2019-03-18 RX ORDER — CALCIPOTRIENE 0.05 MG/ML
1 SOLUTION TOPICAL DAILY
Qty: 1 BOTTLE | Refills: 3 | Status: SHIPPED | OUTPATIENT
Start: 2019-03-18

## 2019-03-18 NOTE — TELEPHONE ENCOUNTER
Pt left message, said the Rx that is a foam was really expensive and that you told her that if she needed you to you could send in the separate ingredients to the pharmacy. Please advise, thank you.

## 2019-03-19 ENCOUNTER — OFFICE VISIT (OUTPATIENT)
Dept: MEDICAL GROUP | Facility: MEDICAL CENTER | Age: 64
End: 2019-03-19
Payer: COMMERCIAL

## 2019-03-19 VITALS
TEMPERATURE: 98.4 F | DIASTOLIC BLOOD PRESSURE: 80 MMHG | HEART RATE: 76 BPM | SYSTOLIC BLOOD PRESSURE: 120 MMHG | OXYGEN SATURATION: 96 % | HEIGHT: 65 IN | WEIGHT: 152 LBS | BODY MASS INDEX: 25.33 KG/M2

## 2019-03-19 DIAGNOSIS — I10 HTN (HYPERTENSION), BENIGN: ICD-10-CM

## 2019-03-19 DIAGNOSIS — K21.9 GASTROESOPHAGEAL REFLUX DISEASE WITHOUT ESOPHAGITIS: ICD-10-CM

## 2019-03-19 DIAGNOSIS — N95.1 HOT FLUSHES, PERIMENOPAUSAL: ICD-10-CM

## 2019-03-19 DIAGNOSIS — G47.00 INSOMNIA, UNSPECIFIED TYPE: ICD-10-CM

## 2019-03-19 DIAGNOSIS — Z91.89 OTHER SPECIFIED PERSONAL RISK FACTORS, NOT ELSEWHERE CLASSIFIED: ICD-10-CM

## 2019-03-19 DIAGNOSIS — G47.9 DISTURBANCE IN SLEEP BEHAVIOR: ICD-10-CM

## 2019-03-19 DIAGNOSIS — Z23 NEED FOR STREPTOCOCCUS PNEUMONIAE VACCINATION: ICD-10-CM

## 2019-03-19 DIAGNOSIS — L40.9 PSORIASIS: ICD-10-CM

## 2019-03-19 PROCEDURE — 90732 PPSV23 VACC 2 YRS+ SUBQ/IM: CPT | Performed by: INTERNAL MEDICINE

## 2019-03-19 PROCEDURE — 90471 IMMUNIZATION ADMIN: CPT | Performed by: INTERNAL MEDICINE

## 2019-03-19 PROCEDURE — 99214 OFFICE O/P EST MOD 30 MIN: CPT | Mod: 25 | Performed by: INTERNAL MEDICINE

## 2019-03-19 RX ORDER — ESTRADIOL 0.5 MG/1
0.5 TABLET ORAL
Qty: 90 TAB | Refills: 3 | Status: SHIPPED | OUTPATIENT
Start: 2019-03-19 | End: 2020-05-14

## 2019-03-19 RX ORDER — ESZOPICLONE 3 MG/1
3 TABLET, FILM COATED ORAL NIGHTLY PRN
Qty: 30 TAB | Refills: 0 | Status: SHIPPED | OUTPATIENT
Start: 2019-04-19 | End: 2019-03-19 | Stop reason: SDUPTHER

## 2019-03-19 RX ORDER — ESZOPICLONE 3 MG/1
3 TABLET, FILM COATED ORAL NIGHTLY PRN
Qty: 30 TAB | Refills: 0 | Status: SHIPPED | OUTPATIENT
Start: 2019-05-20 | End: 2019-06-17 | Stop reason: SDUPTHER

## 2019-03-19 RX ORDER — ESZOPICLONE 3 MG/1
3 TABLET, FILM COATED ORAL NIGHTLY PRN
Qty: 30 TAB | Refills: 0 | Status: SHIPPED | OUTPATIENT
Start: 2019-03-19 | End: 2019-03-19 | Stop reason: SDUPTHER

## 2019-03-19 NOTE — PROGRESS NOTES
Subjective:     Chief Complaint   Patient presents with   • Follow-Up     3 month      Sujata Christina Upton is a 63 y.o. female here today for follow-up of reflux and estrogen therapy and hypertension and psoriasis and refill prescription for sleep medications.    Disturbance in sleep behavior  She continues to have sleep problems.  She wants a prescription for Lunesta.  She is trying to take Unisom which works occasionally.  She understands the dangers of medication such as Ambien and Lunesta.    Esophageal reflux  Her gastroesophageal reflux is under fairly good control primarily with diet.    Hot flushes, perimenopausal  Her hot flashes are under moderately good control with estradiol.  She understands the dangers of chronic estrogen therapy.  She has had a hysterectomy.    HTN (hypertension), benign  Blood pressure is under good control with losartan.    Psoriasis  She has psoriasis involving her scalp she has been told which is causing hair loss.  She has not responded to more conservative therapy so her dermatologist has recommended that she take Spironolactone.  She is not sure that she wants to get off of the losartan at this time.  She thinks that may be her hair is coming back so she wants to hold off trying the Spironolactone until perhaps next visit.       Diagnoses of Insomnia, unspecified type, Other specified personal risk factors, not elsewhere classified, Need for Streptococcus pneumoniae vaccination, HTN (hypertension), benign, Gastroesophageal reflux disease without esophagitis, Psoriasis, Disturbance in sleep behavior, and Hot flushes, perimenopausal were pertinent to this visit.    Allergies: Pcn [penicillins]  Current medicines (including changes today)  Current Outpatient Prescriptions   Medication Sig Dispense Refill   • estradiol (ESTRACE) 0.5 MG tablet Take 1 Tab by mouth every day. 90 Tab 3   • [START ON 5/20/2019] Eszopiclone 3 MG Tab Take 1 Tab by mouth at bedtime as needed for up to 30  days. 30 Tab 0   • betamethasone, augmented, (DIPROLENE) 0.05 % gel Apply 1 Application to affected area(s) every day. To scalp 50 g 2   • Calcipotriene 0.005 % Solution 1 Application by Apply externally route every day. To scalp 1 Bottle 3   • Salicylic Acid 3 % Shampoo 1 Application by Apply externally route every 48 hours. 1 Bottle 2   • zolpidem (AMBIEN) 10 MG Tab Take 0.5 Tabs by mouth at bedtime as needed for Sleep for up to 30 days. 15 Tab 0   • losartan (COZAAR) 50 MG Tab Take 1 Tab by mouth every day for 90 days. 90 Tab 3   • ibuprofen (MOTRIN) 200 MG Tab Take 200 mg by mouth every 6 hours as needed.     • therapeutic multivitamin-minerals (THERAGRAN-M) Tab Take 1 Tab by mouth every day.     • Calcium Carbonate-Vit D-Min (CALCIUM 1200 PO) Take  by mouth.     • valACYclovir (VALTREX) 500 MG Tab Take 4 Tabs by mouth 2 times a day. 4 tablets twice daily for 1 day. Take ASAP at first onset of symptoms 8 Tab 1   • tacrolimus (PROTOPIC) 0.1 % Ointment Apply 1 Application to affected area(s) 2 times a day. To affected area on face 1 Tube 3   • triamcinolone acetonide (KENALOG) 0.1 % Cream Apply 1 Application to affected area(s) 2 times a day. To areas on the BODY (Patient not taking: Reported on 3/19/2019) 60 g 2   • tramadol (ULTRAM) 50 MG Tab Take 1 Tab by mouth every four hours as needed for Moderate Pain or Severe Pain. (Patient not taking: Reported on 3/19/2019) 30 Tab 0   • sulfacetamide (SULAMYD) 10 % Solution Place 1 Drop in left eye every 3 hours. 1 Bottle 0   • citalopram (CELEXA) 20 MG Tab Take 1 Tab by mouth every day. (Patient not taking: Reported on 3/19/2019) 30 Tab 11     No current facility-administered medications for this visit.        She  has a past medical history of Anesthesia; Esophageal reflux (1/9/2015); Headache (1/9/2015); Hot flushes, perimenopausal (1/9/2015); HTN (hypertension), benign (1/9/2015); Neck mass (12/2/2016); Pain (7/1); Psoriasis (10/1/2018); Routine general medical  "examination at a health care facility (8/16/2016); Sleep disturbance, unspecified (1/9/2015); and Stress reaction (8/18/2017). She also has no past medical history of Breast cancer (HCC).  Health Maintenance: She will receive Pneumovax today.  ROS    Patient denies significant change in strength, weight or appetite.  No significant lightheadedness or headaches.  No change in vision, hearing, or swallowing.  No new dyspnea, coughing, chest pain, or palpitations.  No indigestion, abdominal pain, or change in bowel habits.  No change in urinating.  No new ankle swelling.       Objective:     PE:  /80 (BP Location: Left arm, Patient Position: Sitting)   Pulse 76   Temp 36.9 °C (98.4 °F)   Ht 1.651 m (5' 5\")   Wt 68.9 kg (152 lb)   SpO2 96%   BMI 25.29 kg/m²   Neck is supple without significant lymphadenopathy or masses.  Lungs are clear with normal breath sounds without wheezes or rales .  Cardiovascular: peripheral circulation is satisfactory, heart sounds are unchanged and unremarkable.  Abdomen is soft, without masses or tenderness, with normal bowel sounds.  Extremities are without significant edema, cyanosis or deformity.      Assessment and Plan:   The following treatment plan was discussed  1. Insomnia, unspecified type  Eszopiclone 3 MG Tab    DISCONTINUED: Eszopiclone 3 MG Tab    DISCONTINUED: Eszopiclone 3 MG Tab    DISCONTINUED: Eszopiclone 3 MG Tab    Continue same regimen.  Try to taper off of the prescription sleep medications.   2. Other specified personal risk factors, not elsewhere classified.  She would like to get a coronary calcium score.  We reviewed appropriate use of the test and I put in an order. CT-HEART W/O CONT EVAL CALCIUM   3. Need for Streptococcus pneumoniae vaccination  PneumoVax PPV23 =>3yo    She is due for Pneumovax and will receive that today.   4. HTN (hypertension), benign      Continue losartan for now.  Follow low-salt diet.   5. Gastroesophageal reflux disease " without esophagitis      Follow appropriate diet.  Report any significant exacerbation.   6. Psoriasis      Follow-up with dermatologist.  She does not want to try Spironolactone yet.   7. Disturbance in sleep behavior     8. Hot flushes, perimenopausal         Followup: 3 months if not sooner.

## 2019-03-19 NOTE — ASSESSMENT & PLAN NOTE
Her hot flashes are under moderately good control with estradiol.  She understands the dangers of chronic estrogen therapy.  She has had a hysterectomy.

## 2019-03-19 NOTE — ASSESSMENT & PLAN NOTE
She continues to have sleep problems.  She wants a prescription for Lunesta.  She is trying to take Unisom which works occasionally.  She understands the dangers of medication such as Ambien and Lunesta.

## 2019-03-19 NOTE — ASSESSMENT & PLAN NOTE
She has psoriasis involving her scalp she has been told which is causing hair loss.  She has not responded to more conservative therapy so her dermatologist has recommended that she take Spironolactone.  She is not sure that she wants to get off of the losartan at this time.  She thinks that may be her hair is coming back so she wants to hold off trying the Spironolactone until perhaps next visit.

## 2019-06-17 ENCOUNTER — OFFICE VISIT (OUTPATIENT)
Dept: MEDICAL GROUP | Facility: MEDICAL CENTER | Age: 64
End: 2019-06-17
Payer: COMMERCIAL

## 2019-06-17 VITALS
WEIGHT: 143 LBS | OXYGEN SATURATION: 98 % | BODY MASS INDEX: 23.82 KG/M2 | HEART RATE: 60 BPM | SYSTOLIC BLOOD PRESSURE: 130 MMHG | HEIGHT: 65 IN | TEMPERATURE: 97.6 F | DIASTOLIC BLOOD PRESSURE: 78 MMHG

## 2019-06-17 DIAGNOSIS — K21.9 GASTROESOPHAGEAL REFLUX DISEASE WITHOUT ESOPHAGITIS: ICD-10-CM

## 2019-06-17 DIAGNOSIS — G47.00 INSOMNIA, UNSPECIFIED TYPE: ICD-10-CM

## 2019-06-17 DIAGNOSIS — F43.0 STRESS REACTION: ICD-10-CM

## 2019-06-17 DIAGNOSIS — E78.5 HYPERLIPIDEMIA, UNSPECIFIED HYPERLIPIDEMIA TYPE: ICD-10-CM

## 2019-06-17 DIAGNOSIS — M25.512 CHRONIC LEFT SHOULDER PAIN: ICD-10-CM

## 2019-06-17 DIAGNOSIS — N95.1 HOT FLUSHES, PERIMENOPAUSAL: ICD-10-CM

## 2019-06-17 DIAGNOSIS — G89.29 CHRONIC LEFT SHOULDER PAIN: ICD-10-CM

## 2019-06-17 DIAGNOSIS — I10 HTN (HYPERTENSION), BENIGN: ICD-10-CM

## 2019-06-17 DIAGNOSIS — G47.9 DISTURBANCE IN SLEEP BEHAVIOR: ICD-10-CM

## 2019-06-17 PROCEDURE — 99214 OFFICE O/P EST MOD 30 MIN: CPT | Performed by: INTERNAL MEDICINE

## 2019-06-17 RX ORDER — LOSARTAN POTASSIUM 50 MG/1
50 TABLET ORAL
Refills: 3 | COMMUNITY
Start: 2019-03-25 | End: 2020-06-30

## 2019-06-17 RX ORDER — CALCIPOTRIENE AND BETAMETHASONE DIPROPIONATE 50; .5 UG/G; MG/G
AEROSOL, FOAM TOPICAL
Refills: 1 | COMMUNITY
Start: 2019-04-11 | End: 2019-06-20 | Stop reason: SDUPTHER

## 2019-06-17 RX ORDER — ESZOPICLONE 3 MG/1
3 TABLET, FILM COATED ORAL NIGHTLY PRN
Qty: 30 TAB | Refills: 0 | Status: SHIPPED | OUTPATIENT
Start: 2019-07-18 | End: 2019-06-17 | Stop reason: SDUPTHER

## 2019-06-17 RX ORDER — ESZOPICLONE 3 MG/1
3 TABLET, FILM COATED ORAL NIGHTLY PRN
Qty: 30 TAB | Refills: 0 | Status: SHIPPED | OUTPATIENT
Start: 2019-06-17 | End: 2019-06-17 | Stop reason: SDUPTHER

## 2019-06-17 RX ORDER — ESZOPICLONE 3 MG/1
3 TABLET, FILM COATED ORAL NIGHTLY PRN
Qty: 30 TAB | Refills: 0 | Status: SHIPPED | OUTPATIENT
Start: 2019-08-18 | End: 2019-09-20

## 2019-06-17 ASSESSMENT — PATIENT HEALTH QUESTIONNAIRE - PHQ9: CLINICAL INTERPRETATION OF PHQ2 SCORE: 0

## 2019-06-17 NOTE — ASSESSMENT & PLAN NOTE
Hot flashes are fairly well controlled with Estrace.  She understands the consequences of estrogen therapy and potential complications and side effects.

## 2019-06-17 NOTE — PROGRESS NOTES
Subjective:     Chief Complaint   Patient presents with   • Follow-Up     3 month    • Shoulder Pain     x6 months      Sujata Upton is a 63 y.o. female here today for follow-up of her insomnia problem as well gastroesophageal reflux and hypertension and hyperlipidemia.    Chronic left shoulder pain  This patient reports that for the last 6 months or so she has had pain in her left shoulder especially if she lays on that side at night.  She is not aware of any particular injury.  She thinks she has pretty good range of motion about the shoulder.  She has tried taking small doses of ibuprofen that did not seem to help.    Disturbance in sleep behavior  Her sleep disturbance problem remains about the same.  She wants to continue taking Lunesta.    Esophageal reflux  She reports the gastroesophageal reflux is under good control primarily with diet.    Hot flushes, perimenopausal  Hot flashes are fairly well controlled with Estrace.  She understands the consequences of estrogen therapy and potential complications and side effects.    HTN (hypertension), benign  Blood pressure is under fairly good control with low-salt diet.  She notes that when she checks her pressure at the laboratory that pressure is elevated but when it is checked here, it is in a good range.    Hyperlipidemia  She will be getting a lipid panel drawn in the near future.  She is trying to follow appropriate diet.  She has had problems with elevated triglycerides primarily.    Stress reaction  She remains under a moderate amount of stress she reports.       Diagnoses of Insomnia, unspecified type, Chronic left shoulder pain, HTN (hypertension), benign, Disturbance in sleep behavior, Gastroesophageal reflux disease without esophagitis, Hyperlipidemia, unspecified hyperlipidemia type, Hot flushes, perimenopausal, and Stress reaction were pertinent to this visit.    Allergies: Pcn [penicillins]  Current medicines (including changes today)  Current  Outpatient Prescriptions   Medication Sig Dispense Refill   • [START ON 8/18/2019] Eszopiclone 3 MG Tab Take 1 Tab by mouth at bedtime as needed for up to 30 days. 30 Tab 0   • estradiol (ESTRACE) 0.5 MG tablet Take 1 Tab by mouth every day. 90 Tab 3   • betamethasone, augmented, (DIPROLENE) 0.05 % gel Apply 1 Application to affected area(s) every day. To scalp 50 g 2   • Calcipotriene 0.005 % Solution 1 Application by Apply externally route every day. To scalp 1 Bottle 3   • valACYclovir (VALTREX) 500 MG Tab Take 4 Tabs by mouth 2 times a day. 4 tablets twice daily for 1 day. Take ASAP at first onset of symptoms 8 Tab 1   • Salicylic Acid 3 % Shampoo 1 Application by Apply externally route every 48 hours. 1 Bottle 2   • tacrolimus (PROTOPIC) 0.1 % Ointment Apply 1 Application to affected area(s) 2 times a day. To affected area on face 1 Tube 3   • triamcinolone acetonide (KENALOG) 0.1 % Cream Apply 1 Application to affected area(s) 2 times a day. To areas on the BODY 60 g 2   • tramadol (ULTRAM) 50 MG Tab Take 1 Tab by mouth every four hours as needed for Moderate Pain or Severe Pain. 30 Tab 0   • sulfacetamide (SULAMYD) 10 % Solution Place 1 Drop in left eye every 3 hours. 1 Bottle 0   • citalopram (CELEXA) 20 MG Tab Take 1 Tab by mouth every day. 30 Tab 11   • ibuprofen (MOTRIN) 200 MG Tab Take 200 mg by mouth every 6 hours as needed.     • therapeutic multivitamin-minerals (THERAGRAN-M) Tab Take 1 Tab by mouth every day.     • Calcium Carbonate-Vit D-Min (CALCIUM 1200 PO) Take  by mouth.       No current facility-administered medications for this visit.        She  has a past medical history of Anesthesia; Chronic left shoulder pain (6/17/2019); Esophageal reflux (1/9/2015); Headache (1/9/2015); Hot flushes, perimenopausal (1/9/2015); HTN (hypertension), benign (1/9/2015); Neck mass (12/2/2016); Pain (7/1); Psoriasis (10/1/2018); Routine general medical examination at a health care facility (8/16/2016); Sleep  "disturbance, unspecified (1/9/2015); and Stress reaction (8/18/2017). She also has no past medical history of Breast cancer (HCC).    ROS    Patient denies significant change in strength, weight or appetite.  No significant lightheadedness or headaches.  No change in vision, hearing, or swallowing.  No new dyspnea, coughing, chest pain, or palpitations.  No indigestion, abdominal pain, or change in bowel habits.  No change in urinating.  No new ankle swelling.       Objective:     PE:  /78 (BP Location: Left arm, Patient Position: Sitting)   Pulse 60   Temp 36.4 °C (97.6 °F)   Ht 1.651 m (5' 5\")   Wt 64.9 kg (143 lb)   SpO2 98%   BMI 23.80 kg/m²    Neck is supple without significant lymphadenopathy or masses.  Lungs are clear with normal breath sounds without wheezes or rales .  Cardiovascular: peripheral circulation is satisfactory, heart sounds are unchanged and unremarkable.  Abdomen is soft, without masses or tenderness, with normal bowel sounds.  Extremities are without significant edema, cyanosis or deformity.      Assessment and Plan:   The following treatment plan was discussed  1. Insomnia, unspecified type  Eszopiclone 3 MG Tab    DISCONTINUED: Eszopiclone 3 MG Tab    DISCONTINUED: Eszopiclone 3 MG Tab    Continue same regimen.  Try to taper off of the prescription sleep medications.   2. Chronic left shoulder pain  REFERRAL TO PHYSICAL THERAPY Reason for Therapy: Eval/Treat/Report    We will try physical therapy first, then consider referral to orthopedics if not improved.   3. HTN (hypertension), benign      Continue low-salt diet and exercise as tolerated.   4. Disturbance in sleep behavior     5. Gastroesophageal reflux disease without esophagitis      We again reviewed conservative therapy.   6. Hyperlipidemia, unspecified hyperlipidemia type      We discussed again appropriate diet and will await the pending lab results.   7. Hot flushes, perimenopausal      For now she will continue the " estradiol.   8. Stress reaction      She was encouraged in a regular exercise program.       Followup: Return in about 3 months (around 9/17/2019), or H&P, for Long.

## 2019-06-17 NOTE — ASSESSMENT & PLAN NOTE
This patient reports that for the last 6 months or so she has had pain in her left shoulder especially if she lays on that side at night.  She is not aware of any particular injury.  She thinks she has pretty good range of motion about the shoulder.  She has tried taking small doses of ibuprofen that did not seem to help.

## 2019-06-17 NOTE — ASSESSMENT & PLAN NOTE
She will be getting a lipid panel drawn in the near future.  She is trying to follow appropriate diet.  She has had problems with elevated triglycerides primarily.

## 2019-06-17 NOTE — ASSESSMENT & PLAN NOTE
Blood pressure is under fairly good control with low-salt diet.  She notes that when she checks her pressure at the laboratory that pressure is elevated but when it is checked here, it is in a good range.

## 2019-06-20 RX ORDER — CALCIPOTRIENE AND BETAMETHASONE DIPROPIONATE 50; .5 UG/G; MG/G
1 AEROSOL, FOAM TOPICAL
Qty: 1 CAN | Refills: 3 | Status: SHIPPED | OUTPATIENT
Start: 2019-06-20 | End: 2019-06-28 | Stop reason: SDUPTHER

## 2019-06-24 ENCOUNTER — TELEPHONE (OUTPATIENT)
Dept: DERMATOLOGY | Facility: IMAGING CENTER | Age: 64
End: 2019-06-24

## 2019-06-24 NOTE — TELEPHONE ENCOUNTER
[facility] pharmacy states patient will get ENSTILAR 0.005-0.064 % Foam through mail order pharmacy  Instead . They will cancel Rx there .

## 2019-06-28 ENCOUNTER — HOSPITAL ENCOUNTER (OUTPATIENT)
Dept: LAB | Facility: MEDICAL CENTER | Age: 64
End: 2019-06-28
Payer: COMMERCIAL

## 2019-06-28 ENCOUNTER — TELEPHONE (OUTPATIENT)
Dept: DERMATOLOGY | Facility: IMAGING CENTER | Age: 64
End: 2019-06-28

## 2019-06-28 LAB
BDY FAT % MEASURED: 25.5 %
BP DIAS: 76 MMHG
BP SYS: 137 MMHG
CHOLEST SERPL-MCNC: 173 MG/DL (ref 100–199)
DIABETES HTDIA: NO
EVENT NAME HTEVT: NORMAL
FASTING HTFAS: YES
GLUCOSE SERPL-MCNC: 109 MG/DL (ref 65–99)
HDLC SERPL-MCNC: 48 MG/DL
HYPERTENSION HTHYP: YES
LDLC SERPL CALC-MCNC: 79 MG/DL
SCREENING LOC CITY HTCIT: NORMAL
SCREENING LOC STATE HTSTA: NORMAL
SCREENING LOCATION HTLOC: NORMAL
SMOKING HTSMO: NO
SUBSCRIBER ID HTSID: NORMAL
TRIGL SERPL-MCNC: 231 MG/DL (ref 0–149)

## 2019-06-28 PROCEDURE — 80061 LIPID PANEL: CPT

## 2019-06-28 PROCEDURE — S5190 WELLNESS ASSESSMENT BY NONPH: HCPCS

## 2019-06-28 PROCEDURE — 82947 ASSAY GLUCOSE BLOOD QUANT: CPT

## 2019-06-28 PROCEDURE — 36415 COLL VENOUS BLD VENIPUNCTURE: CPT

## 2019-06-28 RX ORDER — CALCIPOTRIENE AND BETAMETHASONE DIPROPIONATE 50; .5 UG/G; MG/G
1 AEROSOL, FOAM TOPICAL
Qty: 1 CAN | Refills: 3 | Status: SHIPPED | OUTPATIENT
Start: 2019-06-28

## 2019-06-28 NOTE — TELEPHONE ENCOUNTER
Pt. Calls today stating Enstilar Foam was sent to local [facility] pharmacy instead of mail order pharmacy.  Pt requesting to please sent to mail order pharmacy on chart.      Contacted patient to let her know Dr. Rizo notified.

## 2019-09-20 ENCOUNTER — OFFICE VISIT (OUTPATIENT)
Dept: MEDICAL GROUP | Facility: MEDICAL CENTER | Age: 64
End: 2019-09-20
Payer: COMMERCIAL

## 2019-09-20 VITALS
TEMPERATURE: 98.1 F | WEIGHT: 142 LBS | SYSTOLIC BLOOD PRESSURE: 120 MMHG | HEART RATE: 64 BPM | HEIGHT: 65 IN | DIASTOLIC BLOOD PRESSURE: 78 MMHG | OXYGEN SATURATION: 96 % | BODY MASS INDEX: 23.66 KG/M2

## 2019-09-20 DIAGNOSIS — R29.898 NECK TIGHTNESS: ICD-10-CM

## 2019-09-20 DIAGNOSIS — Z11.59 ENCOUNTER FOR HEPATITIS C SCREENING TEST FOR LOW RISK PATIENT: ICD-10-CM

## 2019-09-20 DIAGNOSIS — I10 HTN (HYPERTENSION), BENIGN: ICD-10-CM

## 2019-09-20 DIAGNOSIS — G47.00 INSOMNIA, UNSPECIFIED TYPE: ICD-10-CM

## 2019-09-20 DIAGNOSIS — E78.5 HYPERLIPIDEMIA, UNSPECIFIED HYPERLIPIDEMIA TYPE: ICD-10-CM

## 2019-09-20 DIAGNOSIS — M54.2 NECK PAIN: ICD-10-CM

## 2019-09-20 DIAGNOSIS — G47.9 DISTURBANCE IN SLEEP BEHAVIOR: ICD-10-CM

## 2019-09-20 PROCEDURE — 99214 OFFICE O/P EST MOD 30 MIN: CPT | Performed by: INTERNAL MEDICINE

## 2019-09-20 RX ORDER — ESZOPICLONE 3 MG/1
3 TABLET, FILM COATED ORAL NIGHTLY PRN
Qty: 30 TAB | Refills: 0 | Status: SHIPPED | OUTPATIENT
Start: 2019-10-21 | End: 2019-11-20

## 2019-09-20 RX ORDER — ESZOPICLONE 3 MG/1
3 TABLET, FILM COATED ORAL NIGHTLY PRN
Qty: 30 TAB | Refills: 0 | Status: SHIPPED | OUTPATIENT
Start: 2019-11-21 | End: 2019-12-10

## 2019-09-20 RX ORDER — ESZOPICLONE 3 MG/1
3 TABLET, FILM COATED ORAL NIGHTLY PRN
Qty: 30 TAB | Refills: 0 | Status: SHIPPED | OUTPATIENT
Start: 2019-09-20 | End: 2019-10-20

## 2019-09-20 RX ORDER — ESZOPICLONE 3 MG/1
3 TABLET, FILM COATED ORAL NIGHTLY PRN
Qty: 30 TAB | Refills: 0 | Status: SHIPPED | OUTPATIENT
Start: 2019-09-21 | End: 2019-10-21

## 2019-09-20 RX ORDER — CARISOPRODOL 250 MG/1
1 TABLET ORAL NIGHTLY PRN
Qty: 90 TAB | Refills: 0 | Status: SHIPPED | OUTPATIENT
Start: 2019-09-20 | End: 2019-09-20

## 2019-09-20 RX ORDER — CYCLOBENZAPRINE HCL 10 MG
5-10 TABLET ORAL
Qty: 30 TAB | Refills: 0 | Status: SHIPPED | OUTPATIENT
Start: 2019-09-20 | End: 2019-11-10 | Stop reason: SDUPTHER

## 2019-09-20 NOTE — ASSESSMENT & PLAN NOTE
She has noticed some neck tightness especially at night.  She wants to try Soma which may also help her sleep.

## 2019-09-20 NOTE — ASSESSMENT & PLAN NOTE
She continues with hypertension that is controlled with losartan.  She would like to try 25 mg to see if that controls her blood pressure.  She currently is on 50 mg.  She also tries to follow a low-salt diet.

## 2019-09-20 NOTE — ASSESSMENT & PLAN NOTE
She has hyperlipidemia that Is treated primarily with diet.  Most recent cholesterol is 173, triglycerides 231, HDL 48, LDL 79.  She is not interested in taking more medication at this time.  She thinks she can really improve her diet especially to improve her triglycerides.

## 2019-09-20 NOTE — ASSESSMENT & PLAN NOTE
Her chronic sleep disturbance problem is essentially unchanged.  She has noticed some neck tightness for which she would like to try a muscle relaxer to see if that helps her sleep better.  Otherwise she wants her refills of Lunesta.

## 2019-09-20 NOTE — PROGRESS NOTES
Subjective:         Chief Complaint   Patient presents with   • Follow-Up   • Medication Management     Wants soma and sleeping pills     Sujata Upton is a 64 y.o. female here today for follow-up of her hyperlipidemia and hypertension and to get her sleeping meds and for evaluation of neck tightness.    HTN (hypertension), benign  She continues with hypertension that is controlled with losartan.  She would like to try 25 mg to see if that controls her blood pressure.  She currently is on 50 mg.  She also tries to follow a low-salt diet.    Disturbance in sleep behavior  Her chronic sleep disturbance problem is essentially unchanged.  She has noticed some neck tightness for which she would like to try a muscle relaxer to see if that helps her sleep better.  Otherwise she wants her refills of Lunesta.    Hyperlipidemia  She has hyperlipidemia that Is treated primarily with diet.  Most recent cholesterol is 173, triglycerides 231, HDL 48, LDL 79.  She is not interested in taking more medication at this time.  She thinks she can really improve her diet especially to improve her triglycerides.    Neck tightness  She has noticed some neck tightness especially at night.  She wants to try Soma which may also help her sleep.       Diagnoses of Neck pain, Insomnia, unspecified type, Hyperlipidemia, unspecified hyperlipidemia type, Encounter for hepatitis C screening test for low risk patient, Disturbance in sleep behavior, HTN (hypertension), benign, and Neck tightness were pertinent to this visit.    Allergies: Pcn [penicillins]  Current medicines (including changes today)  Current Outpatient Medications   Medication Sig Dispense Refill   • Carisoprodol 250 MG Tab Take 1 Tab by mouth at bedtime as needed for up to 90 days. 90 Tab 0   • [START ON 9/21/2019] Eszopiclone 3 MG Tab Take 1 Tab by mouth at bedtime as needed for up to 30 days. 30 Tab 0   • [START ON 10/21/2019] Eszopiclone 3 MG Tab Take 1 Tab by mouth at  bedtime as needed for up to 30 days. 30 Tab 0   • Eszopiclone 3 MG Tab Take 1 Tab by mouth at bedtime as needed for up to 30 days. 30 Tab 0   • [START ON 10/21/2019] Eszopiclone 3 MG Tab Take 1 Tab by mouth at bedtime as needed for up to 30 days. 30 Tab 0   • [START ON 11/21/2019] Eszopiclone 3 MG Tab Take 1 Tab by mouth at bedtime as needed for up to 30 days. 30 Tab 0   • acetaminophen/caffeine/butalbital 325-40-50 mg (FIORICET) -40 MG Tab TAKE 1 TABLET ORALLY EVERY 6 HRS AS NEEDED FOR HEADACHE START DATE 8/14 END DATE 10/13 R51 60 Tab 0   • ENSTILAR 0.005-0.064 % Foam Apply 1 Application to affected area(s) every bedtime. 1 Can 3   • losartan (COZAAR) 50 MG Tab Take 50 mg by mouth every day. FOR 90 DAYS  3   • estradiol (ESTRACE) 0.5 MG tablet Take 1 Tab by mouth every day. 90 Tab 3   • betamethasone, augmented, (DIPROLENE) 0.05 % gel Apply 1 Application to affected area(s) every day. To scalp 50 g 2   • Calcipotriene 0.005 % Solution 1 Application by Apply externally route every day. To scalp 1 Bottle 3   • valACYclovir (VALTREX) 500 MG Tab Take 4 Tabs by mouth 2 times a day. 4 tablets twice daily for 1 day. Take ASAP at first onset of symptoms 8 Tab 1   • Salicylic Acid 3 % Shampoo 1 Application by Apply externally route every 48 hours. 1 Bottle 2   • tacrolimus (PROTOPIC) 0.1 % Ointment Apply 1 Application to affected area(s) 2 times a day. To affected area on face 1 Tube 3   • triamcinolone acetonide (KENALOG) 0.1 % Cream Apply 1 Application to affected area(s) 2 times a day. To areas on the BODY 60 g 2   • tramadol (ULTRAM) 50 MG Tab Take 1 Tab by mouth every four hours as needed for Moderate Pain or Severe Pain. 30 Tab 0   • sulfacetamide (SULAMYD) 10 % Solution Place 1 Drop in left eye every 3 hours. 1 Bottle 0   • citalopram (CELEXA) 20 MG Tab Take 1 Tab by mouth every day. 30 Tab 11   • ibuprofen (MOTRIN) 200 MG Tab Take 200 mg by mouth every 6 hours as needed.     • therapeutic  "multivitamin-minerals (THERAGRAN-M) Tab Take 1 Tab by mouth every day.     • Calcium Carbonate-Vit D-Min (CALCIUM 1200 PO) Take  by mouth.       No current facility-administered medications for this visit.        She  has a past medical history of Anesthesia, Chronic left shoulder pain (6/17/2019), Esophageal reflux (1/9/2015), Headache (1/9/2015), Hot flushes, perimenopausal (1/9/2015), HTN (hypertension), benign (1/9/2015), Neck mass (12/2/2016), Neck tightness (9/20/2019), Pain (7/1), Psoriasis (10/1/2018), Routine general medical examination at a health care facility (8/16/2016), Sleep disturbance, unspecified (1/9/2015), and Stress reaction (8/18/2017). She also has no past medical history of Breast cancer (HCC).    ROS    Patient denies significant change in strength, weight or appetite.  No significant lightheadedness or headaches.  No change in vision, hearing, or swallowing.  No new dyspnea, coughing, chest pain, or palpitations.  No indigestion, abdominal pain, or change in bowel habits.  No change in urinating.  No new ankle swelling.       Objective:     PE:  /78 (BP Location: Left arm, Patient Position: Sitting)   Pulse 64   Temp 36.7 °C (98.1 °F)   Ht 1.651 m (5' 5\")   Wt 64.4 kg (142 lb)   SpO2 96%   BMI 23.63 kg/m²    Neck is supple without significant lymphadenopathy or masses.  Lungs are clear with normal breath sounds without wheezes or rales .  Cardiovascular: peripheral circulation is satisfactory, heart sounds are unchanged and unremarkable.  Abdomen is soft, without masses or tenderness, with normal bowel sounds.  Extremities are without significant edema, cyanosis or deformity.      Assessment and Plan:   The following treatment plan was discussed  1. Neck pain  Carisoprodol 250 MG Tab    We discussed range of motion exercises.  I did give her a prescription for Soma.  We reviewed potential side effects.   2. Insomnia, unspecified type  Eszopiclone 3 MG Tab    Eszopiclone 3 MG Tab "    Eszopiclone 3 MG Tab    Eszopiclone 3 MG Tab    Eszopiclone 3 MG Tab    Continue same regimen.  Try to taper off of the prescription sleep medications especially if she will be taking Soma.   3. Hyperlipidemia, unspecified hyperlipidemia type  Lipid Profile    We reviewed appropriate diet and will check another lipid panel in 3 months.   4. Encounter for hepatitis C screening test for low risk patient  HEP C VIRUS ANTIBODY    She has not been screened for hepatitis C and we will get that done today.   5. Disturbance in sleep behavior      I refilled her Lunesta.  We also again discussed potential side effects.   6. HTN (hypertension), benign      Continue losartan 25 mg daily and follow low-salt diet.  She will check her pressures at home and report them to us.   7. Neck tightness      She will try taking Soma 250 mg at bedtime.  We reviewed potential side effects.       Followup: Return in about 3 months (around 12/20/2019).

## 2019-09-24 ENCOUNTER — IMMUNIZATION (OUTPATIENT)
Dept: OCCUPATIONAL MEDICINE | Facility: CLINIC | Age: 64
End: 2019-09-24

## 2019-09-24 DIAGNOSIS — Z23 NEED FOR VACCINATION: ICD-10-CM

## 2019-09-24 PROCEDURE — 90686 IIV4 VACC NO PRSV 0.5 ML IM: CPT | Performed by: PREVENTIVE MEDICINE

## 2019-10-01 ENCOUNTER — HOSPITAL ENCOUNTER (OUTPATIENT)
Dept: RADIOLOGY | Facility: MEDICAL CENTER | Age: 64
End: 2019-10-01
Attending: PHYSICIAN ASSISTANT
Payer: COMMERCIAL

## 2019-10-01 DIAGNOSIS — M25.561 RIGHT KNEE PAIN, UNSPECIFIED CHRONICITY: ICD-10-CM

## 2019-10-01 PROCEDURE — 73721 MRI JNT OF LWR EXTRE W/O DYE: CPT | Mod: RT

## 2019-11-11 RX ORDER — CYCLOBENZAPRINE HCL 10 MG
TABLET ORAL
Qty: 30 TAB | Refills: 2 | Status: SHIPPED | OUTPATIENT
Start: 2019-11-11

## 2019-12-02 ENCOUNTER — HOSPITAL ENCOUNTER (OUTPATIENT)
Dept: RADIOLOGY | Facility: MEDICAL CENTER | Age: 64
End: 2019-12-02
Attending: INTERNAL MEDICINE
Payer: COMMERCIAL

## 2019-12-02 DIAGNOSIS — Z12.31 VISIT FOR SCREENING MAMMOGRAM: ICD-10-CM

## 2019-12-02 PROCEDURE — 77063 BREAST TOMOSYNTHESIS BI: CPT

## 2019-12-10 ENCOUNTER — OFFICE VISIT (OUTPATIENT)
Dept: MEDICAL GROUP | Facility: MEDICAL CENTER | Age: 64
End: 2019-12-10
Payer: COMMERCIAL

## 2019-12-10 VITALS
SYSTOLIC BLOOD PRESSURE: 120 MMHG | HEIGHT: 65 IN | DIASTOLIC BLOOD PRESSURE: 72 MMHG | HEART RATE: 66 BPM | BODY MASS INDEX: 24.66 KG/M2 | OXYGEN SATURATION: 97 % | TEMPERATURE: 98 F | WEIGHT: 148 LBS

## 2019-12-10 DIAGNOSIS — I10 HTN (HYPERTENSION), BENIGN: ICD-10-CM

## 2019-12-10 DIAGNOSIS — R73.9 HYPERGLYCEMIA: ICD-10-CM

## 2019-12-10 DIAGNOSIS — K21.9 GASTROESOPHAGEAL REFLUX DISEASE WITHOUT ESOPHAGITIS: ICD-10-CM

## 2019-12-10 DIAGNOSIS — G47.9 DISTURBANCE IN SLEEP BEHAVIOR: ICD-10-CM

## 2019-12-10 DIAGNOSIS — Z11.59 ENCOUNTER FOR HEPATITIS C SCREENING TEST FOR LOW RISK PATIENT: ICD-10-CM

## 2019-12-10 DIAGNOSIS — G47.00 INSOMNIA, UNSPECIFIED TYPE: ICD-10-CM

## 2019-12-10 DIAGNOSIS — M25.561 ACUTE PAIN OF RIGHT KNEE: ICD-10-CM

## 2019-12-10 DIAGNOSIS — E78.5 HYPERLIPIDEMIA, UNSPECIFIED HYPERLIPIDEMIA TYPE: ICD-10-CM

## 2019-12-10 PROCEDURE — 99214 OFFICE O/P EST MOD 30 MIN: CPT | Performed by: INTERNAL MEDICINE

## 2019-12-10 RX ORDER — ESZOPICLONE 3 MG/1
3 TABLET, FILM COATED ORAL NIGHTLY PRN
Qty: 30 TAB | Refills: 2 | Status: SHIPPED | OUTPATIENT
Start: 2019-12-10 | End: 2020-03-02

## 2019-12-10 NOTE — ASSESSMENT & PLAN NOTE
Her lipid panel shows cholesterol 173, triglycerides 231, HDL 48, LDL 79.  This was obtained in June.  She thinks she can improve her diet and she refuses further medication at this time.  Also of note is that at that time her blood sugar was a little high and we discussed appropriate dietary changes for that.

## 2019-12-10 NOTE — PROGRESS NOTES
Subjective:     Chief Complaint   Patient presents with   • Follow-Up     3m   • Medication Management     Sleeping medication      Sujata Upton is a 64 y.o. female here today for follow-up of hyperlipidemia and hyperglycemia and sleep disturbance problem and gastroesophageal reflux and hypertension.    HTN (hypertension), benign  Blood pressure is under good control with losartan 25 mg daily along with low-salt diet.  She denies lightheadedness.    Disturbance in sleep behavior  Her sleep disturbance problem remains essentially the same.  She continues Lunesta usually 1/2 tablet at night.    Esophageal reflux  Esophageal reflux is fairly well controlled primarily with diet.    Hyperlipidemia  Her lipid panel shows cholesterol 173, triglycerides 231, HDL 48, LDL 79.  This was obtained in June.  She thinks she can improve her diet and she refuses further medication at this time.  Also of note is that at that time her blood sugar was a little high and we discussed appropriate dietary changes for that.    Encounter for hepatitis C screening test for low risk patient  She has not been screened for hepatitis C and we will get that done at next lab draw.    Hyperglycemia  As noted, with prior lab draw blood sugar was a little high at 109.  We reviewed appropriate conservative therapy of that.  I also encouraged her in an exercise program.    Insomnia  She continues Lunesta.  We reviewed the dangers of taking this medication with other such medications.  She again was encouraged to taper off of Lunesta.       Diagnoses of Insomnia, unspecified type, Hyperlipidemia, unspecified hyperlipidemia type, Encounter for hepatitis C screening test for low risk patient, Hypertension, Hyperglycemia, Gastroesophageal reflux disease without esophagitis, Acute pain of right knee, and Disturbance in sleep behavior were pertinent to this visit.    Allergies: Pcn [penicillins]  Current medicines (including changes today)  Current  Outpatient Medications   Medication Sig Dispense Refill   • Eszopiclone 3 MG Tab Take 1 Tab by mouth at bedtime as needed for up to 30 days. 30 Tab 2   • cyclobenzaprine (FLEXERIL) 10 MG Tab TAKE 1/2 TAB-1 TAB BY MOUTH AT BEDTIME AS NEEDED FOR MUSCLE SPASMS. 30 Tab 2   • losartan (COZAAR) 50 MG Tab Take 50 mg by mouth every day. FOR 90 DAYS  3   • estradiol (ESTRACE) 0.5 MG tablet Take 1 Tab by mouth every day. 90 Tab 3   • ibuprofen (MOTRIN) 200 MG Tab Take 200 mg by mouth every 6 hours as needed.     • therapeutic multivitamin-minerals (THERAGRAN-M) Tab Take 1 Tab by mouth every day.     • Calcium Carbonate-Vit D-Min (CALCIUM 1200 PO) Take  by mouth.     • ENSTILAR 0.005-0.064 % Foam Apply 1 Application to affected area(s) every bedtime. (Patient not taking: Reported on 12/10/2019) 1 Can 3   • betamethasone, augmented, (DIPROLENE) 0.05 % gel Apply 1 Application to affected area(s) every day. To scalp (Patient not taking: Reported on 12/10/2019) 50 g 2   • Calcipotriene 0.005 % Solution 1 Application by Apply externally route every day. To scalp (Patient not taking: Reported on 12/10/2019) 1 Bottle 3   • valACYclovir (VALTREX) 500 MG Tab Take 4 Tabs by mouth 2 times a day. 4 tablets twice daily for 1 day. Take ASAP at first onset of symptoms (Patient not taking: Reported on 12/10/2019) 8 Tab 1   • Salicylic Acid 3 % Shampoo 1 Application by Apply externally route every 48 hours. (Patient not taking: Reported on 12/10/2019) 1 Bottle 2   • tacrolimus (PROTOPIC) 0.1 % Ointment Apply 1 Application to affected area(s) 2 times a day. To affected area on face (Patient not taking: Reported on 12/10/2019) 1 Tube 3   • triamcinolone acetonide (KENALOG) 0.1 % Cream Apply 1 Application to affected area(s) 2 times a day. To areas on the BODY (Patient not taking: Reported on 12/10/2019) 60 g 2   • tramadol (ULTRAM) 50 MG Tab Take 1 Tab by mouth every four hours as needed for Moderate Pain or Severe Pain. 30 Tab 0   •  "sulfacetamide (SULAMYD) 10 % Solution Place 1 Drop in left eye every 3 hours. (Patient not taking: Reported on 12/10/2019) 1 Bottle 0   • citalopram (CELEXA) 20 MG Tab Take 1 Tab by mouth every day. (Patient not taking: Reported on 12/10/2019) 30 Tab 11     No current facility-administered medications for this visit.        She  has a past medical history of Anesthesia, Chronic left shoulder pain (6/17/2019), Esophageal reflux (1/9/2015), Headache (1/9/2015), Hot flushes, perimenopausal (1/9/2015), HTN (hypertension), benign (1/9/2015), Neck mass (12/2/2016), Neck tightness (9/20/2019), Pain (7/1), Psoriasis (10/1/2018), Routine general medical examination at a health care facility (8/16/2016), Sleep disturbance, unspecified (1/9/2015), and Stress reaction (8/18/2017). She also has no past medical history of Breast cancer (HCC).    ROS    Patient denies significant change in strength, weight or appetite.  No significant lightheadedness or headaches.  No change in vision, hearing, or swallowing.  No new dyspnea, coughing, chest pain, or palpitations.  No indigestion, abdominal pain, or change in bowel habits.  No change in urinating.  No new ankle swelling.  She thinks she again tore her meniscus in her right knee.       Objective:     PE:  /72 (BP Location: Left arm, Patient Position: Sitting)   Pulse 66   Temp 36.7 °C (98 °F)   Ht 1.651 m (5' 5\")   Wt 67.1 kg (148 lb)   SpO2 97%   BMI 24.63 kg/m²    Neck is supple without significant lymphadenopathy or masses.  Lungs are clear with normal breath sounds without wheezes or rales .  Cardiovascular: peripheral circulation is satisfactory, heart sounds are unchanged and unremarkable.  Abdomen is soft, without masses or tenderness, with normal bowel sounds.  Extremities are without significant edema, cyanosis or deformity.  No significant knee effusion.      Assessment and Plan:   The following treatment plan was discussed  1. Insomnia, unspecified type  " Eszopiclone 3 MG Tab    Continue same regimen.  Try to taper off of the prescription sleep medications especially if she will be taking Soma.   2. Hyperlipidemia, unspecified hyperlipidemia type  Lipid Profile    Again long discussion regarding appropriate diet and she will repeat the lipid panel.   3. Encounter for hepatitis C screening test for low risk patient  HEP C VIRUS ANTIBODY    We will screen for hepatitis C with next lab draw.   4. Hypertension  Basic Metabolic Panel    Continue losartan 25 mg daily and low-salt diet.   5. Hyperglycemia  HEMOGLOBIN A1C    Avoid concentrated sweets especially on empty stomach.  Exercise.   6. Gastroesophageal reflux disease without esophagitis      We reviewed appropriate conservative therapy.  She will report any significant exacerbation.   7. Acute pain of right knee      We reviewed appropriate exercises for torn meniscus.   8. Disturbance in sleep behavior      Try to gradually taper off of Lunesta.  We again discussed conservative regimen.       Followup: Return in about 3 months (around 3/10/2020) for Short.

## 2019-12-10 NOTE — ASSESSMENT & PLAN NOTE
She continues Lunesta.  We reviewed the dangers of taking this medication with other such medications.  She again was encouraged to taper off of Lunesta.

## 2019-12-10 NOTE — ASSESSMENT & PLAN NOTE
Blood pressure is under good control with losartan 25 mg daily along with low-salt diet.  She denies lightheadedness.

## 2019-12-10 NOTE — ASSESSMENT & PLAN NOTE
Her sleep disturbance problem remains essentially the same.  She continues Lunesta usually 1/2 tablet at night.

## 2019-12-10 NOTE — ASSESSMENT & PLAN NOTE
As noted, with prior lab draw blood sugar was a little high at 109.  We reviewed appropriate conservative therapy of that.  I also encouraged her in an exercise program.

## 2020-03-02 ENCOUNTER — OFFICE VISIT (OUTPATIENT)
Dept: MEDICAL GROUP | Facility: MEDICAL CENTER | Age: 65
End: 2020-03-02
Payer: COMMERCIAL

## 2020-03-02 VITALS
HEART RATE: 78 BPM | RESPIRATION RATE: 16 BRPM | WEIGHT: 156 LBS | DIASTOLIC BLOOD PRESSURE: 74 MMHG | HEIGHT: 65 IN | OXYGEN SATURATION: 96 % | TEMPERATURE: 98.7 F | SYSTOLIC BLOOD PRESSURE: 126 MMHG | BODY MASS INDEX: 25.99 KG/M2

## 2020-03-02 DIAGNOSIS — K21.9 GASTROESOPHAGEAL REFLUX DISEASE WITHOUT ESOPHAGITIS: ICD-10-CM

## 2020-03-02 DIAGNOSIS — I10 HTN (HYPERTENSION), BENIGN: ICD-10-CM

## 2020-03-02 DIAGNOSIS — Z79.899 CONTROLLED SUBSTANCE AGREEMENT SIGNED: ICD-10-CM

## 2020-03-02 DIAGNOSIS — G47.00 INSOMNIA, UNSPECIFIED TYPE: ICD-10-CM

## 2020-03-02 DIAGNOSIS — S46.911D RIGHT SHOULDER STRAIN, SUBSEQUENT ENCOUNTER: ICD-10-CM

## 2020-03-02 DIAGNOSIS — G89.29 CHRONIC LEFT SHOULDER PAIN: ICD-10-CM

## 2020-03-02 DIAGNOSIS — E78.5 HYPERLIPIDEMIA, UNSPECIFIED HYPERLIPIDEMIA TYPE: ICD-10-CM

## 2020-03-02 DIAGNOSIS — G47.9 DISTURBANCE IN SLEEP BEHAVIOR: ICD-10-CM

## 2020-03-02 DIAGNOSIS — M25.512 CHRONIC LEFT SHOULDER PAIN: ICD-10-CM

## 2020-03-02 DIAGNOSIS — R05.9 COUGH: ICD-10-CM

## 2020-03-02 PROCEDURE — 99214 OFFICE O/P EST MOD 30 MIN: CPT | Performed by: INTERNAL MEDICINE

## 2020-03-02 RX ORDER — ESZOPICLONE 3 MG/1
3 TABLET, FILM COATED ORAL NIGHTLY PRN
Qty: 30 TAB | Refills: 2 | Status: SHIPPED | OUTPATIENT
Start: 2020-03-02 | End: 2020-05-31

## 2020-03-02 RX ORDER — TRAMADOL HYDROCHLORIDE 50 MG/1
50 TABLET ORAL EVERY 8 HOURS PRN
Qty: 20 TAB | Refills: 0 | Status: SHIPPED | OUTPATIENT
Start: 2020-03-02 | End: 2020-03-12

## 2020-03-02 NOTE — ASSESSMENT & PLAN NOTE
She has gastroesophageal reflux for which she takes Prilosec periodically.  Symptoms are under moderately good control.

## 2020-03-02 NOTE — ASSESSMENT & PLAN NOTE
She has chronic left shoulder pain especially when she reaches behind her or over her head.  She takes Motrin which does seem to help but she does not get into anti-inflammatory doses.  Higher doses of Motrin cause gastrointestinal complaints she reports.

## 2020-03-02 NOTE — PROGRESS NOTES
Subjective:     Chief Complaint   Patient presents with   • Shoulder Pain     Left shoulder   • Medication Refill     Sujata Upton is a 64 y.o. female here today for follow-up of her hypertension and hyperlipidemia and chronic cough and insomnia and new left shoulder pain.    HTN (hypertension), benign  Hypertension is under fairly good control with losartan 25 mg daily.  She wonders whether the losartan however could be contributing to her chronic cough.  She is trying to follow a low-salt diet.  She denies lightheadedness.    Esophageal reflux  She has gastroesophageal reflux for which she takes Prilosec periodically.  Symptoms are under moderately good control.    Hyperlipidemia  She has hyperlipidemia and will be getting a lipid panel in the fairly near future.  She is trying to follow appropriate diet.    Chronic left shoulder pain  She has chronic left shoulder pain especially when she reaches behind her or over her head.  She takes Motrin which does seem to help but she does not get into anti-inflammatory doses.  Higher doses of Motrin cause gastrointestinal complaints she reports.    Insomnia  Her insomnia problem remains essentially the same.  She takes Lunesta regularly.    Cough  She reports that she has a chronic cough and she wonders whether losartan would be contributing to that.  She thinks that when she treats her gastroesophageal reflux that the cough improves however.       Diagnoses of Right shoulder strain, subsequent encounter, Insomnia, unspecified type, HTN (hypertension), benign, Disturbance in sleep behavior, Gastroesophageal reflux disease without esophagitis, Hyperlipidemia, unspecified hyperlipidemia type, Chronic left shoulder pain, Cough, and Controlled substance agreement signed were pertinent to this visit.    Allergies: Pcn [penicillins]  Current medicines (including changes today)  Current Outpatient Medications   Medication Sig Dispense Refill   • tramadol (ULTRAM) 50 MG Tab  Take 1 Tab by mouth every 8 hours as needed for Moderate Pain or Severe Pain for up to 10 days. 20 Tab 0   • Eszopiclone 3 MG Tab Take 1 Tab by mouth at bedtime as needed for up to 90 days. 30 Tab 2   • cyclobenzaprine (FLEXERIL) 10 MG Tab TAKE 1/2 TAB-1 TAB BY MOUTH AT BEDTIME AS NEEDED FOR MUSCLE SPASMS. 30 Tab 2   • losartan (COZAAR) 50 MG Tab Take 50 mg by mouth every day. FOR 90 DAYS  3   • estradiol (ESTRACE) 0.5 MG tablet Take 1 Tab by mouth every day. 90 Tab 3   • valACYclovir (VALTREX) 500 MG Tab Take 4 Tabs by mouth 2 times a day. 4 tablets twice daily for 1 day. Take ASAP at first onset of symptoms 8 Tab 1   • ibuprofen (MOTRIN) 200 MG Tab Take 200 mg by mouth every 6 hours as needed.     • therapeutic multivitamin-minerals (THERAGRAN-M) Tab Take 1 Tab by mouth every day.     • Calcium Carbonate-Vit D-Min (CALCIUM 1200 PO) Take  by mouth.     • ENSTILAR 0.005-0.064 % Foam Apply 1 Application to affected area(s) every bedtime. (Patient not taking: Reported on 12/10/2019) 1 Can 3   • betamethasone, augmented, (DIPROLENE) 0.05 % gel Apply 1 Application to affected area(s) every day. To scalp (Patient not taking: Reported on 12/10/2019) 50 g 2   • Calcipotriene 0.005 % Solution 1 Application by Apply externally route every day. To scalp (Patient not taking: Reported on 12/10/2019) 1 Bottle 3   • Salicylic Acid 3 % Shampoo 1 Application by Apply externally route every 48 hours. (Patient not taking: Reported on 12/10/2019) 1 Bottle 2   • tacrolimus (PROTOPIC) 0.1 % Ointment Apply 1 Application to affected area(s) 2 times a day. To affected area on face (Patient not taking: Reported on 12/10/2019) 1 Tube 3   • triamcinolone acetonide (KENALOG) 0.1 % Cream Apply 1 Application to affected area(s) 2 times a day. To areas on the BODY (Patient not taking: Reported on 12/10/2019) 60 g 2   • sulfacetamide (SULAMYD) 10 % Solution Place 1 Drop in left eye every 3 hours. (Patient not taking: Reported on 12/10/2019) 1  "Bottle 0     No current facility-administered medications for this visit.        She  has a past medical history of Anesthesia, Chronic left shoulder pain (6/17/2019), Controlled substance agreement signed (3/2/2020), Esophageal reflux (1/9/2015), Headache (1/9/2015), Hot flushes, perimenopausal (1/9/2015), HTN (hypertension), benign (1/9/2015), Neck mass (12/2/2016), Neck tightness (9/20/2019), Pain (7/1), Psoriasis (10/1/2018), Routine general medical examination at a health care facility (8/16/2016), Sleep disturbance, unspecified (1/9/2015), and Stress reaction (8/18/2017). She also has no past medical history of Breast cancer (HCC).  Health Maintenance: She received her first Shingrix vaccination recently.  ROS    Patient denies significant change in strength, weight or appetite.  No significant lightheadedness or headaches.  No change in vision, hearing, or swallowing.  No new dyspnea, coughing, chest pain, or palpitations.  No indigestion, abdominal pain, or change in bowel habits.  No change in urinating.  No new ankle swelling.       Objective:     PE:  /74 (BP Location: Left arm)   Pulse 78   Temp 37.1 °C (98.7 °F)   Resp 16   Ht 1.651 m (5' 5\")   Wt 70.8 kg (156 lb)   SpO2 96%   BMI 25.96 kg/m²    Neck is supple without significant lymphadenopathy or masses.  Lungs are clear with normal breath sounds without wheezes or rales .  Cardiovascular: peripheral circulation is satisfactory, heart sounds are unchanged and unremarkable.  Abdomen is soft, without masses or tenderness, with normal bowel sounds.  Extremities are without significant edema, cyanosis or deformity.  There is some tenderness on reaching behind her although there is good range of motion.  There is also some tenderness on elevation of the left arm laterally.  There is no palpable abnormality nor crepitus.      Assessment and Plan:   The following treatment plan was discussed  1. Right shoulder strain, subsequent encounter  " tramadol (ULTRAM) 50 MG Tab    Controlled Substance Treatment Agreement    She wants some tramadol to have around for shoulder pain.   2. Insomnia, unspecified type  Eszopiclone 3 MG Tab    Continue same regimen.  Try to taper off of the prescription sleep medications especially if she will be taking Soma.   3. HTN (hypertension), benign      Continue low-salt diet.  Try stopping losartan to see if cough improves.  Check blood pressure regularly and report back.   4. Disturbance in sleep behavior      Try to taper down on Lunesta.   5. Gastroesophageal reflux disease without esophagitis      We reviewed appropriate conservative therapy.  She will try Pepcid Complete.   6. Hyperlipidemia, unspecified hyperlipidemia type      We briefly reviewed appropriate diet.  She will get a lipid panel through [facility].   7. Chronic left shoulder pain      Try Naprosyn in anti-inflammatory doses.  If not better, orthopedic consultation.   8. Cough      Try stopping losartan to see if cough improves.  Check blood pressure regularly and report back.  Continue low-salt diet.   9. Controlled substance agreement signed         Followup: Return in about 3 months (around 6/2/2020) for Short.

## 2020-03-02 NOTE — ASSESSMENT & PLAN NOTE
She reports that she has a chronic cough and she wonders whether losartan would be contributing to that.  She thinks that when she treats her gastroesophageal reflux that the cough improves however.

## 2020-03-02 NOTE — ASSESSMENT & PLAN NOTE
Hypertension is under fairly good control with losartan 25 mg daily.  She wonders whether the losartan however could be contributing to her chronic cough.  She is trying to follow a low-salt diet.  She denies lightheadedness.

## 2020-03-02 NOTE — ASSESSMENT & PLAN NOTE
She has hyperlipidemia and will be getting a lipid panel in the fairly near future.  She is trying to follow appropriate diet.

## 2020-05-11 RX ORDER — BUTALBITAL, ACETAMINOPHEN AND CAFFEINE 50; 325; 40 MG/1; MG/1; MG/1
TABLET ORAL
Qty: 60 TAB | Refills: 1 | Status: SHIPPED
Start: 2020-05-11 | End: 2020-05-12

## 2020-05-12 ENCOUNTER — TELEPHONE (OUTPATIENT)
Dept: MEDICAL GROUP | Facility: MEDICAL CENTER | Age: 65
End: 2020-05-12

## 2020-05-12 RX ORDER — BUTALBITAL, ACETAMINOPHEN AND CAFFEINE 50; 325; 40 MG/1; MG/1; MG/1
TABLET ORAL
Qty: 60 TAB | Refills: 0 | Status: SHIPPED
Start: 2020-05-12 | End: 2020-06-11

## 2020-05-12 NOTE — TELEPHONE ENCOUNTER
Can you please print this prescription out with day supply and sign?  Pharmacy is asking since it's a controlled substance   Medication: acetaminophen/caffeine/butalbital 325-40-50 mg (FIORICET) -40

## 2020-05-14 RX ORDER — ESTRADIOL 0.5 MG/1
TABLET ORAL
Qty: 90 TAB | Refills: 3 | Status: SHIPPED | OUTPATIENT
Start: 2020-05-14 | End: 2020-06-30

## 2020-06-12 DIAGNOSIS — R51.9 NONINTRACTABLE EPISODIC HEADACHE, UNSPECIFIED HEADACHE TYPE: ICD-10-CM

## 2020-06-12 RX ORDER — BUTALBITAL, ACETAMINOPHEN AND CAFFEINE 50; 325; 40 MG/1; MG/1; MG/1
TABLET ORAL
Qty: 60 TAB | Refills: 0 | Status: SHIPPED | OUTPATIENT
Start: 2020-06-12 | End: 2020-06-30

## 2020-06-29 ENCOUNTER — HOSPITAL ENCOUNTER (OUTPATIENT)
Dept: LAB | Facility: MEDICAL CENTER | Age: 65
End: 2020-06-29
Attending: INTERNAL MEDICINE
Payer: COMMERCIAL

## 2020-06-29 DIAGNOSIS — Z11.59 ENCOUNTER FOR HEPATITIS C SCREENING TEST FOR LOW RISK PATIENT: ICD-10-CM

## 2020-06-29 DIAGNOSIS — I10 HTN (HYPERTENSION), BENIGN: ICD-10-CM

## 2020-06-29 DIAGNOSIS — R73.9 HYPERGLYCEMIA: ICD-10-CM

## 2020-06-29 DIAGNOSIS — E78.5 HYPERLIPIDEMIA, UNSPECIFIED HYPERLIPIDEMIA TYPE: ICD-10-CM

## 2020-06-29 LAB
ANION GAP SERPL CALC-SCNC: 12 MMOL/L (ref 7–16)
BUN SERPL-MCNC: 17 MG/DL (ref 8–22)
CALCIUM SERPL-MCNC: 9.8 MG/DL (ref 8.5–10.5)
CHLORIDE SERPL-SCNC: 103 MMOL/L (ref 96–112)
CHOLEST SERPL-MCNC: 195 MG/DL (ref 100–199)
CO2 SERPL-SCNC: 25 MMOL/L (ref 20–33)
CREAT SERPL-MCNC: 0.71 MG/DL (ref 0.5–1.4)
EST. AVERAGE GLUCOSE BLD GHB EST-MCNC: 123 MG/DL
FASTING STATUS PATIENT QL REPORTED: NORMAL
GLUCOSE SERPL-MCNC: 104 MG/DL (ref 65–99)
HBA1C MFR BLD: 5.9 % (ref 0–5.6)
HCV AB SER QL: NORMAL
HDLC SERPL-MCNC: 55 MG/DL
LDLC SERPL CALC-MCNC: 87 MG/DL
POTASSIUM SERPL-SCNC: 4.5 MMOL/L (ref 3.6–5.5)
SODIUM SERPL-SCNC: 140 MMOL/L (ref 135–145)
TRIGL SERPL-MCNC: 263 MG/DL (ref 0–149)

## 2020-06-29 PROCEDURE — 80061 LIPID PANEL: CPT

## 2020-06-29 PROCEDURE — 83036 HEMOGLOBIN GLYCOSYLATED A1C: CPT

## 2020-06-29 PROCEDURE — 86803 HEPATITIS C AB TEST: CPT

## 2020-06-29 PROCEDURE — 36415 COLL VENOUS BLD VENIPUNCTURE: CPT

## 2020-06-29 PROCEDURE — 80048 BASIC METABOLIC PNL TOTAL CA: CPT

## 2020-06-30 ENCOUNTER — OFFICE VISIT (OUTPATIENT)
Dept: MEDICAL GROUP | Facility: MEDICAL CENTER | Age: 65
End: 2020-06-30
Payer: COMMERCIAL

## 2020-06-30 VITALS
HEART RATE: 73 BPM | TEMPERATURE: 98.7 F | WEIGHT: 150 LBS | BODY MASS INDEX: 24.99 KG/M2 | HEIGHT: 65 IN | RESPIRATION RATE: 16 BRPM | OXYGEN SATURATION: 98 % | DIASTOLIC BLOOD PRESSURE: 74 MMHG | SYSTOLIC BLOOD PRESSURE: 118 MMHG

## 2020-06-30 DIAGNOSIS — G47.9 DISTURBANCE IN SLEEP BEHAVIOR: ICD-10-CM

## 2020-06-30 DIAGNOSIS — R06.00 DYSPNEA, UNSPECIFIED TYPE: ICD-10-CM

## 2020-06-30 DIAGNOSIS — I10 HTN (HYPERTENSION), BENIGN: ICD-10-CM

## 2020-06-30 DIAGNOSIS — R51.9 NONINTRACTABLE EPISODIC HEADACHE, UNSPECIFIED HEADACHE TYPE: ICD-10-CM

## 2020-06-30 DIAGNOSIS — R73.9 HYPERGLYCEMIA: ICD-10-CM

## 2020-06-30 DIAGNOSIS — G47.00 INSOMNIA, UNSPECIFIED TYPE: ICD-10-CM

## 2020-06-30 DIAGNOSIS — K21.9 GASTROESOPHAGEAL REFLUX DISEASE WITHOUT ESOPHAGITIS: ICD-10-CM

## 2020-06-30 DIAGNOSIS — E78.5 HYPERLIPIDEMIA, UNSPECIFIED HYPERLIPIDEMIA TYPE: ICD-10-CM

## 2020-06-30 PROCEDURE — 99214 OFFICE O/P EST MOD 30 MIN: CPT | Performed by: INTERNAL MEDICINE

## 2020-06-30 RX ORDER — BUTALBITAL, ACETAMINOPHEN AND CAFFEINE 50; 325; 40 MG/1; MG/1; MG/1
TABLET ORAL
Qty: 60 TAB | Refills: 0 | Status: SHIPPED | OUTPATIENT
Start: 2020-06-30 | End: 2020-08-30

## 2020-06-30 RX ORDER — ESZOPICLONE 3 MG/1
3 TABLET, FILM COATED ORAL NIGHTLY PRN
Qty: 30 TAB | Refills: 2 | Status: SHIPPED | OUTPATIENT
Start: 2020-06-30 | End: 2020-11-11

## 2020-06-30 RX ORDER — LOSARTAN POTASSIUM 50 MG/1
50 TABLET ORAL
Qty: 90 TAB | Refills: 3 | Status: SHIPPED
Start: 2020-06-30 | End: 2020-07-31

## 2020-06-30 RX ORDER — ESTRADIOL 0.5 MG/1
0.5 TABLET ORAL
Qty: 90 TAB | Refills: 3 | Status: SHIPPED
Start: 2020-06-30 | End: 2021-04-22

## 2020-06-30 NOTE — ASSESSMENT & PLAN NOTE
Her hypertension is fairly well controlled with losartan.  She denies significant lightheadedness.

## 2020-06-30 NOTE — ASSESSMENT & PLAN NOTE
She has had mildly elevated blood sugar at this time her glucose is 104.  As noted, she does not eat sweets she reports.

## 2020-06-30 NOTE — ASSESSMENT & PLAN NOTE
She tries to follow appropriate diet for her hyperlipidemia.  Most recent cholesterol is 195, triglycerides 263, HDL 55, LDL 87.  She reports that she is avoiding sweets as much as possible and is very frustrated that her triglycerides remain elevated.  She does not want to try fenofibrate at this time and would rather work more diligently at diet and take fish oil tablets.

## 2020-06-30 NOTE — PROGRESS NOTES
Subjective:     Chief Complaint   Patient presents with   • Medication Refill     Sujata Upton is a 65 y.o. female here today for Follow-up of her hypertension and insomnia and esophageal reflux and headaches and hyperlipidemia and hyperglycemia and she has noticed exertional dyspnea.    HTN (hypertension), benign  Her hypertension is fairly well controlled with losartan.  She denies significant lightheadedness.    Disturbance in sleep behavior  Her sleep disturbance problem remains about the same.  She continues using Lunesta.  She refuses to try lower dose.    Esophageal reflux  Gastroesophageal reflux remains under fairly good control primarily with diet and Pepcid.    Headache  Her headaches remain under fairly good control with Fioricet.  She is under a large amount of stress and as noted, will be moving to [place] this summer.  She is worried that that may aggravate her headaches.    Hyperlipidemia  She tries to follow appropriate diet for her hyperlipidemia.  Most recent cholesterol is 195, triglycerides 263, HDL 55, LDL 87.  She reports that she is avoiding sweets as much as possible and is very frustrated that her triglycerides remain elevated.  She does not want to try fenofibrate at this time and would rather work more diligently at diet and take fish oil tablets.    Hyperglycemia  She has had mildly elevated blood sugar at this time her glucose is 104.  As noted, she does not eat sweets she reports.    Dyspnea  She reports dyspnea especially when she first gets up in the morning and dyspnea with exertion.  This seems to be gradually getting worse over the last few months.  She denies chest pain or palpitations.  She had normal spirometry about 5 years ago.  She reports that when she first gets up in the morning she has a lot of fairly thick mucus in her lungs.  She thinks that is contributing to her dyspnea.       Diagnoses of Nonintractable episodic headache, unspecified headache type, Insomnia,  unspecified type, Dyspnea, unspecified type, HTN (hypertension), benign, Disturbance in sleep behavior, Gastroesophageal reflux disease without esophagitis, Hyperlipidemia, unspecified hyperlipidemia type, and Hyperglycemia were pertinent to this visit.    Allergies: Pcn [penicillins]  Current medicines (including changes today)  Current Outpatient Medications   Medication Sig Dispense Refill   • estradiol (ESTRACE) 0.5 MG tablet Take 1 Tab by mouth every day. 90 Tab 3   • losartan (COZAAR) 50 MG Tab Take 1 Tab by mouth every day. FOR 90 DAYS 90 Tab 3   • acetaminophen/caffeine/butalbital 325-40-50 mg (FIORICET) -40 MG Tab TAKE 1 TABLET EVERY 6 HRS AS NEEDED FOR HEADACHE R51 30 DAY SUPPLY 60 Tab 0   • Eszopiclone 3 MG Tab Take 1 Tab by mouth at bedtime as needed for up to 90 days. 30 Tab 2   • cyclobenzaprine (FLEXERIL) 10 MG Tab TAKE 1/2 TAB-1 TAB BY MOUTH AT BEDTIME AS NEEDED FOR MUSCLE SPASMS. 30 Tab 2   • valACYclovir (VALTREX) 500 MG Tab Take 4 Tabs by mouth 2 times a day. 4 tablets twice daily for 1 day. Take ASAP at first onset of symptoms 8 Tab 1   • ibuprofen (MOTRIN) 200 MG Tab Take 200 mg by mouth every 6 hours as needed.     • Calcium Carbonate-Vit D-Min (CALCIUM 1200 PO) Take  by mouth.     • ENSTILAR 0.005-0.064 % Foam Apply 1 Application to affected area(s) every bedtime. (Patient not taking: Reported on 12/10/2019) 1 Can 3   • betamethasone, augmented, (DIPROLENE) 0.05 % gel Apply 1 Application to affected area(s) every day. To scalp (Patient not taking: Reported on 12/10/2019) 50 g 2   • Calcipotriene 0.005 % Solution 1 Application by Apply externally route every day. To scalp (Patient not taking: Reported on 12/10/2019) 1 Bottle 3   • Salicylic Acid 3 % Shampoo 1 Application by Apply externally route every 48 hours. (Patient not taking: Reported on 12/10/2019) 1 Bottle 2   • tacrolimus (PROTOPIC) 0.1 % Ointment Apply 1 Application to affected area(s) 2 times a day. To affected area on face  "(Patient not taking: Reported on 12/10/2019) 1 Tube 3   • triamcinolone acetonide (KENALOG) 0.1 % Cream Apply 1 Application to affected area(s) 2 times a day. To areas on the BODY (Patient not taking: Reported on 12/10/2019) 60 g 2   • sulfacetamide (SULAMYD) 10 % Solution Place 1 Drop in left eye every 3 hours. (Patient not taking: Reported on 12/10/2019) 1 Bottle 0   • therapeutic multivitamin-minerals (THERAGRAN-M) Tab Take 1 Tab by mouth every day.       No current facility-administered medications for this visit.        She  has a past medical history of Anesthesia, Chronic left shoulder pain (6/17/2019), Controlled substance agreement signed (3/2/2020), Esophageal reflux (1/9/2015), Headache (1/9/2015), Hot flushes, perimenopausal (1/9/2015), HTN (hypertension), benign (1/9/2015), Neck mass (12/2/2016), Neck tightness (9/20/2019), Pain (7/1), Psoriasis (10/1/2018), Routine general medical examination at a health care facility (8/16/2016), Sleep disturbance, unspecified (1/9/2015), and Stress reaction (8/18/2017). She also has no past medical history of Breast cancer (HCC).  Health Maintenance: She reports that she is up-to-date on vaccinations.  ROS    Patient denies significant change in strength, weight or appetite.  No significant lightheadedness or Change in headaches.  No change in vision, hearing, or swallowing.  No new dyspnea, coughing, chest pain, or palpitations Except for exertional dyspnea as noted above.  No indigestion, abdominal pain, or change in bowel habits.  Gastroesophageal reflux is about the same.  No change in urinating.  No new ankle swelling.       Objective:     PE:  /74 (BP Location: Left arm)   Pulse 73   Temp 37.1 °C (98.7 °F)   Resp 16   Ht 1.651 m (5' 5\")   Wt 68 kg (150 lb)   SpO2 98%   BMI 24.96 kg/m²   Neck is supple without significant lymphadenopathy or masses.  Lungs are clear with normal breath sounds without wheezes or rales .  Cardiovascular: peripheral " circulation is satisfactory, heart sounds are unchanged and unremarkable.  Abdomen is soft, without masses or tenderness, with normal bowel sounds.  Extremities are without significant edema, cyanosis or deformity.      Assessment and Plan:   The following treatment plan was discussed  1. Nonintractable episodic headache, unspecified headache type  acetaminophen/caffeine/butalbital 325-40-50 mg (FIORICET) -40 MG Tab    Continue same regimen and continue Fioricet as needed.  Headaches may improve once she is moved.   2. Insomnia, unspecified type  Eszopiclone 3 MG Tab    Continue same regimen.  Try to taper off of the prescription sleep medications especially if she will be taking Soma.   3. Dyspnea, unspecified type  PULMONARY FUNCTION TESTS -Test requested: Spirometry with-out & with Bronchodilator; Include MIPS/MEPS? No    We will again check spirometry with and without bronchodilator.  Consider echocardiogram.   4. HTN (hypertension), benign      Continue losartan and low-salt diet.   5. Disturbance in sleep behavior      She was encouraged again to continue trying to taper down on Lunesta.   6. Gastroesophageal reflux disease without esophagitis      We again reviewed appropriate conservative therapy including foods to avoid.   7. Hyperlipidemia, unspecified hyperlipidemia type      We again reviewed appropriate diet.  She may need to start fenofibrate.   8. Hyperglycemia      Continue avoidance of concentrated sweets, exercise regularly, trim down on weight.       Followup: Return in about 4 weeks (around 7/28/2020) for Long.

## 2020-06-30 NOTE — ASSESSMENT & PLAN NOTE
She reports dyspnea especially when she first gets up in the morning and dyspnea with exertion.  This seems to be gradually getting worse over the last few months.  She denies chest pain or palpitations.  She had normal spirometry about 5 years ago.  She reports that when she first gets up in the morning she has a lot of fairly thick mucus in her lungs.  She thinks that is contributing to her dyspnea.

## 2020-06-30 NOTE — ASSESSMENT & PLAN NOTE
Her sleep disturbance problem remains about the same.  She continues using Lunesta.  She refuses to try lower dose.

## 2020-06-30 NOTE — ASSESSMENT & PLAN NOTE
Her headaches remain under fairly good control with Fioricet.  She is under a large amount of stress and as noted, will be moving to [place] this summer.  She is worried that that may aggravate her headaches.

## 2020-07-29 ENCOUNTER — APPOINTMENT (OUTPATIENT)
Dept: PULMONOLOGY | Facility: MEDICAL CENTER | Age: 65
End: 2020-07-29
Attending: INTERNAL MEDICINE
Payer: COMMERCIAL

## 2020-07-31 ENCOUNTER — OFFICE VISIT (OUTPATIENT)
Dept: MEDICAL GROUP | Facility: MEDICAL CENTER | Age: 65
End: 2020-07-31
Payer: COMMERCIAL

## 2020-07-31 VITALS
HEIGHT: 65 IN | SYSTOLIC BLOOD PRESSURE: 122 MMHG | HEART RATE: 68 BPM | DIASTOLIC BLOOD PRESSURE: 72 MMHG | RESPIRATION RATE: 16 BRPM | WEIGHT: 149 LBS | OXYGEN SATURATION: 97 % | TEMPERATURE: 98.7 F | BODY MASS INDEX: 24.83 KG/M2

## 2020-07-31 DIAGNOSIS — L20.89 FLEXURAL ATOPIC DERMATITIS: ICD-10-CM

## 2020-07-31 DIAGNOSIS — E78.5 HYPERLIPIDEMIA, UNSPECIFIED HYPERLIPIDEMIA TYPE: ICD-10-CM

## 2020-07-31 DIAGNOSIS — F43.0 STRESS REACTION: ICD-10-CM

## 2020-07-31 DIAGNOSIS — K21.9 GASTROESOPHAGEAL REFLUX DISEASE WITHOUT ESOPHAGITIS: ICD-10-CM

## 2020-07-31 DIAGNOSIS — I10 HTN (HYPERTENSION), BENIGN: ICD-10-CM

## 2020-07-31 DIAGNOSIS — G47.9 DISTURBANCE IN SLEEP BEHAVIOR: ICD-10-CM

## 2020-07-31 PROCEDURE — 99214 OFFICE O/P EST MOD 30 MIN: CPT | Performed by: INTERNAL MEDICINE

## 2020-07-31 RX ORDER — LOSARTAN POTASSIUM 25 MG/1
25 TABLET ORAL
Qty: 90 TAB | Refills: 3 | Status: SHIPPED | OUTPATIENT
Start: 2020-07-31

## 2020-07-31 RX ORDER — HYDROXYZINE PAMOATE 25 MG/1
25 CAPSULE ORAL 3 TIMES DAILY PRN
Qty: 30 CAP | Refills: 3 | Status: SHIPPED | OUTPATIENT
Start: 2020-07-31

## 2020-07-31 NOTE — ASSESSMENT & PLAN NOTE
Blood pressures under good control with losartan.  She wants to try decreasing the dose to 25 mg daily.  She denies significant lightheadedness.

## 2020-07-31 NOTE — ASSESSMENT & PLAN NOTE
Her sleep disturbance problem remains essentially unchanged.  She is not interested in trying to switch to any different medication at least at this time.

## 2020-07-31 NOTE — PROGRESS NOTES
Subjective:     Chief Complaint   Patient presents with   • Follow-Up     Sujata Upton is a 65 y.o. female here today for Follow-up of her hypertension and sleep disturbance problem and esophageal reflux and hyperlipidemia and stress.  Today is her last day of work.  She will be retiring to [place].    HTN (hypertension), benign  Blood pressures under good control with losartan.  She wants to try decreasing the dose to 25 mg daily.  She denies significant lightheadedness.    Disturbance in sleep behavior  Her sleep disturbance problem remains essentially unchanged.  She is not interested in trying to switch to any different medication at least at this time.    Esophageal reflux  She reports the gastroesophageal reflux is under fairly good control primarily with diet.    Hyperlipidemia  Hyperlipidemia is controlled primarily with diet.  She did not get any labs drawn for today.    Stress reaction  She remains under a lot of stress with her 's health problems.  In addition they are packing up and getting ready to move to [place].  Today is her last day of work.    Flexural atopic dermatitis  She has atopic dermatitis most prominently in the left antecubital fossa and anterior neck.  She does have a sensitivity to multiple substances.  She has been evaluated by an allergist.       Diagnoses of HTN (hypertension), benign, Disturbance in sleep behavior, Gastroesophageal reflux disease without esophagitis, Hyperlipidemia, unspecified hyperlipidemia type, Stress reaction, and Flexural atopic dermatitis were pertinent to this visit.    Allergies: Pcn [penicillins]  Current medicines (including changes today)  Current Outpatient Medications   Medication Sig Dispense Refill   • losartan (COZAAR) 25 MG Tab Take 1 Tab by mouth every day. FOR 90 DAYS 90 Tab 3   • hydrOXYzine pamoate (VISTARIL) 25 MG Cap Take 1 Cap by mouth 3 times a day as needed for Itching or Anxiety. 30 Cap 3   • estradiol (ESTRACE) 0.5 MG tablet  Take 1 Tab by mouth every day. 90 Tab 3   • acetaminophen/caffeine/butalbital 325-40-50 mg (FIORICET) -40 MG Tab TAKE 1 TABLET EVERY 6 HRS AS NEEDED FOR HEADACHE R51 30 DAY SUPPLY 60 Tab 0   • Eszopiclone 3 MG Tab Take 1 Tab by mouth at bedtime as needed for up to 90 days. 30 Tab 2   • cyclobenzaprine (FLEXERIL) 10 MG Tab TAKE 1/2 TAB-1 TAB BY MOUTH AT BEDTIME AS NEEDED FOR MUSCLE SPASMS. 30 Tab 2   • valACYclovir (VALTREX) 500 MG Tab Take 4 Tabs by mouth 2 times a day. 4 tablets twice daily for 1 day. Take ASAP at first onset of symptoms 8 Tab 1   • ibuprofen (MOTRIN) 200 MG Tab Take 200 mg by mouth every 6 hours as needed.     • therapeutic multivitamin-minerals (THERAGRAN-M) Tab Take 1 Tab by mouth every day.     • Calcium Carbonate-Vit D-Min (CALCIUM 1200 PO) Take  by mouth.     • ENSTILAR 0.005-0.064 % Foam Apply 1 Application to affected area(s) every bedtime. (Patient not taking: Reported on 12/10/2019) 1 Can 3   • betamethasone, augmented, (DIPROLENE) 0.05 % gel Apply 1 Application to affected area(s) every day. To scalp (Patient not taking: Reported on 12/10/2019) 50 g 2   • Calcipotriene 0.005 % Solution 1 Application by Apply externally route every day. To scalp (Patient not taking: Reported on 12/10/2019) 1 Bottle 3   • Salicylic Acid 3 % Shampoo 1 Application by Apply externally route every 48 hours. (Patient not taking: Reported on 12/10/2019) 1 Bottle 2   • tacrolimus (PROTOPIC) 0.1 % Ointment Apply 1 Application to affected area(s) 2 times a day. To affected area on face (Patient not taking: Reported on 12/10/2019) 1 Tube 3   • triamcinolone acetonide (KENALOG) 0.1 % Cream Apply 1 Application to affected area(s) 2 times a day. To areas on the BODY (Patient not taking: Reported on 12/10/2019) 60 g 2   • sulfacetamide (SULAMYD) 10 % Solution Place 1 Drop in left eye every 3 hours. (Patient not taking: Reported on 12/10/2019) 1 Bottle 0     No current facility-administered medications for this  "visit.        She  has a past medical history of Anesthesia, Chronic left shoulder pain (6/17/2019), Controlled substance agreement signed (3/2/2020), Esophageal reflux (1/9/2015), Flexural atopic dermatitis (7/31/2020), Headache (1/9/2015), Hot flushes, perimenopausal (1/9/2015), HTN (hypertension), benign (1/9/2015), Neck mass (12/2/2016), Neck tightness (9/20/2019), Pain (7/1), Psoriasis (10/1/2018), Routine general medical examination at a health care facility (8/16/2016), Sleep disturbance, unspecified (1/9/2015), and Stress reaction (8/18/2017). She also has no past medical history of Breast cancer (HCC).  Health Maintenance: She is up to date.  ROS    Patient denies significant change in strength, weight or appetite.  No significant lightheadedness or headaches.  No change in vision, hearing, or swallowing.  No new dyspnea, coughing, chest pain, or palpitations.  No indigestion, abdominal pain, or change in bowel habits.  No change in urinating.  No new ankle swelling.       Objective:     PE:  /72 (BP Location: Left arm)   Pulse 68   Temp 37.1 °C (98.7 °F)   Resp 16   Ht 1.651 m (5' 5\")   Wt 67.6 kg (149 lb)   SpO2 97%   BMI 24.79 kg/m²   Neck is supple without significant lymphadenopathy or masses.  Lungs are clear with normal breath sounds without wheezes or rales .  Cardiovascular: peripheral circulation is satisfactory, heart sounds are unchanged and unremarkable.  Abdomen is soft, without masses or tenderness, with normal bowel sounds.  Extremities are without significant edema, cyanosis or deformity.      Assessment and Plan:   The following treatment plan was discussed  1. HTN (hypertension), benign      No medication change at this time.  Follow low-salt diet.  Continue to check blood pressures at home and report results.   2. Disturbance in sleep behavior      No changes at this time.  We discussed switching to trazodone but she refused.   3. Gastroesophageal reflux disease without " esophagitis      We again reviewed appropriate conservative therapy.   4. Hyperlipidemia, unspecified hyperlipidemia type      We again reviewed appropriate diet.  She will get labs when she moved to [place].   5. Stress reaction      I gave her prescription for Vistaril.  We reviewed side effects and medication interactions.  She might benefit from an SSRI.   6. Flexural atopic dermatitis      Vistaril may help.  She will try Benadryl cream.       Followup: She will be getting a physician when she moved to [place].  If we can be of any help in the meantime, she was encouraged to contact us.

## 2020-07-31 NOTE — ASSESSMENT & PLAN NOTE
She remains under a lot of stress with her 's health problems.  In addition they are packing up and getting ready to move to [place].  Today is her last day of work.

## 2020-07-31 NOTE — ASSESSMENT & PLAN NOTE
She has atopic dermatitis most prominently in the left antecubital fossa and anterior neck.  She does have a sensitivity to multiple substances.  She has been evaluated by an allergist.

## 2020-08-05 ENCOUNTER — ANESTHESIA EVENT (OUTPATIENT)
Dept: SURGERY | Facility: MEDICAL CENTER | Age: 65
End: 2020-08-05
Payer: COMMERCIAL

## 2020-08-05 ENCOUNTER — ANESTHESIA (OUTPATIENT)
Dept: SURGERY | Facility: MEDICAL CENTER | Age: 65
End: 2020-08-05
Payer: COMMERCIAL

## 2020-08-05 ENCOUNTER — HOSPITAL ENCOUNTER (EMERGENCY)
Facility: MEDICAL CENTER | Age: 65
End: 2020-08-06
Attending: EMERGENCY MEDICINE
Payer: COMMERCIAL

## 2020-08-05 ENCOUNTER — OFFICE VISIT (OUTPATIENT)
Dept: URGENT CARE | Facility: CLINIC | Age: 65
End: 2020-08-05
Payer: COMMERCIAL

## 2020-08-05 ENCOUNTER — APPOINTMENT (OUTPATIENT)
Dept: RADIOLOGY | Facility: MEDICAL CENTER | Age: 65
End: 2020-08-05
Attending: EMERGENCY MEDICINE
Payer: COMMERCIAL

## 2020-08-05 VITALS
SYSTOLIC BLOOD PRESSURE: 108 MMHG | TEMPERATURE: 98.8 F | RESPIRATION RATE: 14 BRPM | BODY MASS INDEX: 24.83 KG/M2 | HEIGHT: 65 IN | OXYGEN SATURATION: 95 % | DIASTOLIC BLOOD PRESSURE: 60 MMHG | WEIGHT: 149 LBS | HEART RATE: 80 BPM

## 2020-08-05 DIAGNOSIS — R11.2 NAUSEA AND VOMITING, INTRACTABILITY OF VOMITING NOT SPECIFIED, UNSPECIFIED VOMITING TYPE: ICD-10-CM

## 2020-08-05 DIAGNOSIS — R10.30 LOWER ABDOMINAL PAIN: ICD-10-CM

## 2020-08-05 DIAGNOSIS — K35.30 ACUTE APPENDICITIS WITH LOCALIZED PERITONITIS, WITHOUT PERFORATION, ABSCESS, OR GANGRENE: ICD-10-CM

## 2020-08-05 DIAGNOSIS — G89.18 POST-OPERATIVE PAIN: ICD-10-CM

## 2020-08-05 LAB
ALBUMIN SERPL BCP-MCNC: 4.4 G/DL (ref 3.2–4.9)
ALBUMIN/GLOB SERPL: 1.6 G/DL
ALP SERPL-CCNC: 85 U/L (ref 30–99)
ALT SERPL-CCNC: 13 U/L (ref 2–50)
ANION GAP SERPL CALC-SCNC: 14 MMOL/L (ref 7–16)
APPEARANCE UR: CLEAR
APPEARANCE UR: CLEAR
AST SERPL-CCNC: 25 U/L (ref 12–45)
BACTERIA #/AREA URNS HPF: ABNORMAL /HPF
BASOPHILS # BLD AUTO: 0.8 % (ref 0–1.8)
BASOPHILS # BLD: 0.06 K/UL (ref 0–0.12)
BILIRUB SERPL-MCNC: 0.3 MG/DL (ref 0.1–1.5)
BILIRUB UR QL STRIP.AUTO: NEGATIVE
BILIRUB UR STRIP-MCNC: NEGATIVE MG/DL
BUN SERPL-MCNC: 11 MG/DL (ref 8–22)
CALCIUM SERPL-MCNC: 9.2 MG/DL (ref 8.4–10.2)
CHLORIDE SERPL-SCNC: 102 MMOL/L (ref 96–112)
CO2 SERPL-SCNC: 25 MMOL/L (ref 20–33)
COLOR UR AUTO: YELLOW
COLOR UR: YELLOW
COVID ORDER STATUS COVID19: NORMAL
CREAT SERPL-MCNC: 0.62 MG/DL (ref 0.5–1.4)
EOSINOPHIL # BLD AUTO: 0.65 K/UL (ref 0–0.51)
EOSINOPHIL NFR BLD: 8.8 % (ref 0–6.9)
EPI CELLS #/AREA URNS HPF: ABNORMAL /HPF
ERYTHROCYTE [DISTWIDTH] IN BLOOD BY AUTOMATED COUNT: 46.3 FL (ref 35.9–50)
GLOBULIN SER CALC-MCNC: 2.8 G/DL (ref 1.9–3.5)
GLUCOSE SERPL-MCNC: 88 MG/DL (ref 65–99)
GLUCOSE UR STRIP.AUTO-MCNC: NEGATIVE MG/DL
GLUCOSE UR STRIP.AUTO-MCNC: NEGATIVE MG/DL
HCT VFR BLD AUTO: 41.1 % (ref 37–47)
HGB BLD-MCNC: 13.3 G/DL (ref 12–16)
IMM GRANULOCYTES # BLD AUTO: 0.02 K/UL (ref 0–0.11)
IMM GRANULOCYTES NFR BLD AUTO: 0.3 % (ref 0–0.9)
KETONES UR STRIP.AUTO-MCNC: 15 MG/DL
KETONES UR STRIP.AUTO-MCNC: NEGATIVE MG/DL
LEUKOCYTE ESTERASE UR QL STRIP.AUTO: ABNORMAL
LEUKOCYTE ESTERASE UR QL STRIP.AUTO: NORMAL
LIPASE SERPL-CCNC: 30 U/L (ref 7–58)
LYMPHOCYTES # BLD AUTO: 2.65 K/UL (ref 1–4.8)
LYMPHOCYTES NFR BLD: 35.9 % (ref 22–41)
MCH RBC QN AUTO: 31.2 PG (ref 27–33)
MCHC RBC AUTO-ENTMCNC: 32.4 G/DL (ref 33.6–35)
MCV RBC AUTO: 96.5 FL (ref 81.4–97.8)
MICRO URNS: ABNORMAL
MONOCYTES # BLD AUTO: 0.58 K/UL (ref 0–0.85)
MONOCYTES NFR BLD AUTO: 7.8 % (ref 0–13.4)
NEUTROPHILS # BLD AUTO: 3.43 K/UL (ref 2–7.15)
NEUTROPHILS NFR BLD: 46.4 % (ref 44–72)
NITRITE UR QL STRIP.AUTO: NEGATIVE
NITRITE UR QL STRIP.AUTO: NEGATIVE
NRBC # BLD AUTO: 0 K/UL
NRBC BLD-RTO: 0 /100 WBC
PH UR STRIP.AUTO: 6 [PH] (ref 5–8)
PH UR STRIP.AUTO: 7 [PH] (ref 5–8)
PLATELET # BLD AUTO: 282 K/UL (ref 164–446)
PMV BLD AUTO: 10.4 FL (ref 9–12.9)
POTASSIUM SERPL-SCNC: 4.1 MMOL/L (ref 3.6–5.5)
PROT SERPL-MCNC: 7.2 G/DL (ref 6–8.2)
PROT UR QL STRIP: NEGATIVE MG/DL
PROT UR QL STRIP: NEGATIVE MG/DL
RBC # BLD AUTO: 4.26 M/UL (ref 4.2–5.4)
RBC # URNS HPF: ABNORMAL /HPF
RBC UR QL AUTO: NEGATIVE
RBC UR QL AUTO: NEGATIVE
SARS-COV-2 RNA RESP QL NAA+PROBE: NOTDETECTED
SODIUM SERPL-SCNC: 141 MMOL/L (ref 135–145)
SP GR UR STRIP.AUTO: 1.01
SP GR UR STRIP.AUTO: 1.02
SPECIMEN SOURCE: NORMAL
UROBILINOGEN UR STRIP-MCNC: 1 MG/DL
WBC # BLD AUTO: 7.4 K/UL (ref 4.8–10.8)
WBC #/AREA URNS HPF: ABNORMAL /HPF

## 2020-08-05 PROCEDURE — 81002 URINALYSIS NONAUTO W/O SCOPE: CPT | Performed by: PHYSICIAN ASSISTANT

## 2020-08-05 PROCEDURE — 88304 TISSUE EXAM BY PATHOLOGIST: CPT

## 2020-08-05 PROCEDURE — 700101 HCHG RX REV CODE 250: Performed by: ANESTHESIOLOGY

## 2020-08-05 PROCEDURE — 501570 HCHG TROCAR, SEPARATOR: Performed by: SURGERY

## 2020-08-05 PROCEDURE — 81001 URINALYSIS AUTO W/SCOPE: CPT

## 2020-08-05 PROCEDURE — 160009 HCHG ANES TIME/MIN: Performed by: SURGERY

## 2020-08-05 PROCEDURE — 160048 HCHG OR STATISTICAL LEVEL 1-5: Performed by: SURGERY

## 2020-08-05 PROCEDURE — 700111 HCHG RX REV CODE 636 W/ 250 OVERRIDE (IP): Performed by: EMERGENCY MEDICINE

## 2020-08-05 PROCEDURE — 96367 TX/PROPH/DG ADDL SEQ IV INF: CPT

## 2020-08-05 PROCEDURE — 99291 CRITICAL CARE FIRST HOUR: CPT

## 2020-08-05 PROCEDURE — 700102 HCHG RX REV CODE 250 W/ 637 OVERRIDE(OP)

## 2020-08-05 PROCEDURE — 160035 HCHG PACU - 1ST 60 MINS PHASE I: Performed by: SURGERY

## 2020-08-05 PROCEDURE — 700101 HCHG RX REV CODE 250: Performed by: SURGERY

## 2020-08-05 PROCEDURE — 501583 HCHG TROCAR, THRD CAN&SEAL 5X100: Performed by: SURGERY

## 2020-08-05 PROCEDURE — 83690 ASSAY OF LIPASE: CPT

## 2020-08-05 PROCEDURE — C9803 HOPD COVID-19 SPEC COLLECT: HCPCS | Performed by: EMERGENCY MEDICINE

## 2020-08-05 PROCEDURE — 74177 CT ABD & PELVIS W/CONTRAST: CPT | Mod: MG

## 2020-08-05 PROCEDURE — 160036 HCHG PACU - EA ADDL 30 MINS PHASE I: Performed by: SURGERY

## 2020-08-05 PROCEDURE — 501450 HCHG STAPLES, ENDO MULTIFIRE: Performed by: SURGERY

## 2020-08-05 PROCEDURE — 160002 HCHG RECOVERY MINUTES (STAT): Performed by: SURGERY

## 2020-08-05 PROCEDURE — 160029 HCHG SURGERY MINUTES - 1ST 30 MINS LEVEL 4: Performed by: SURGERY

## 2020-08-05 PROCEDURE — 96365 THER/PROPH/DIAG IV INF INIT: CPT

## 2020-08-05 PROCEDURE — 80053 COMPREHEN METABOLIC PANEL: CPT

## 2020-08-05 PROCEDURE — 500002 HCHG ADHESIVE, DERMABOND: Performed by: SURGERY

## 2020-08-05 PROCEDURE — 502571 HCHG PACK, LAP CHOLE: Performed by: SURGERY

## 2020-08-05 PROCEDURE — 500800 HCHG LAPAROSCOPIC J/L HOOK: Performed by: SURGERY

## 2020-08-05 PROCEDURE — 160041 HCHG SURGERY MINUTES - EA ADDL 1 MIN LEVEL 4: Performed by: SURGERY

## 2020-08-05 PROCEDURE — 700105 HCHG RX REV CODE 258: Performed by: SURGERY

## 2020-08-05 PROCEDURE — A9270 NON-COVERED ITEM OR SERVICE: HCPCS

## 2020-08-05 PROCEDURE — 700101 HCHG RX REV CODE 250: Performed by: EMERGENCY MEDICINE

## 2020-08-05 PROCEDURE — 85025 COMPLETE CBC W/AUTO DIFF WBC: CPT

## 2020-08-05 PROCEDURE — U0003 INFECTIOUS AGENT DETECTION BY NUCLEIC ACID (DNA OR RNA); SEVERE ACUTE RESPIRATORY SYNDROME CORONAVIRUS 2 (SARS-COV-2) (CORONAVIRUS DISEASE [COVID-19]), AMPLIFIED PROBE TECHNIQUE, MAKING USE OF HIGH THROUGHPUT TECHNOLOGIES AS DESCRIBED BY CMS-2020-01-R: HCPCS

## 2020-08-05 PROCEDURE — 501399 HCHG SPECIMAN BAG, ENDO CATC: Performed by: SURGERY

## 2020-08-05 PROCEDURE — 99214 OFFICE O/P EST MOD 30 MIN: CPT | Performed by: PHYSICIAN ASSISTANT

## 2020-08-05 PROCEDURE — 501568 HCHG TROCAR, BLUNTPORT 12MM: Performed by: SURGERY

## 2020-08-05 PROCEDURE — 700111 HCHG RX REV CODE 636 W/ 250 OVERRIDE (IP): Performed by: ANESTHESIOLOGY

## 2020-08-05 PROCEDURE — 700117 HCHG RX CONTRAST REV CODE 255: Performed by: EMERGENCY MEDICINE

## 2020-08-05 RX ORDER — ROCURONIUM BROMIDE 10 MG/ML
INJECTION, SOLUTION INTRAVENOUS PRN
Status: DISCONTINUED | OUTPATIENT
Start: 2020-08-05 | End: 2020-08-05 | Stop reason: SURG

## 2020-08-05 RX ORDER — ONDANSETRON 2 MG/ML
INJECTION INTRAMUSCULAR; INTRAVENOUS PRN
Status: DISCONTINUED | OUTPATIENT
Start: 2020-08-05 | End: 2020-08-05 | Stop reason: SURG

## 2020-08-05 RX ORDER — CIPROFLOXACIN 2 MG/ML
400 INJECTION, SOLUTION INTRAVENOUS ONCE
Status: COMPLETED | OUTPATIENT
Start: 2020-08-05 | End: 2020-08-05

## 2020-08-05 RX ORDER — HYDRALAZINE HYDROCHLORIDE 20 MG/ML
5 INJECTION INTRAMUSCULAR; INTRAVENOUS
Status: DISCONTINUED | OUTPATIENT
Start: 2020-08-05 | End: 2020-08-06 | Stop reason: HOSPADM

## 2020-08-05 RX ORDER — BUPIVACAINE HYDROCHLORIDE AND EPINEPHRINE 5; 5 MG/ML; UG/ML
INJECTION, SOLUTION PERINEURAL
Status: DISCONTINUED | OUTPATIENT
Start: 2020-08-05 | End: 2020-08-05 | Stop reason: HOSPADM

## 2020-08-05 RX ORDER — OXYCODONE HCL 5 MG/5 ML
5 SOLUTION, ORAL ORAL
Status: DISCONTINUED | OUTPATIENT
Start: 2020-08-05 | End: 2020-08-06 | Stop reason: HOSPADM

## 2020-08-05 RX ORDER — DIPHENHYDRAMINE HYDROCHLORIDE 50 MG/ML
12.5 INJECTION INTRAMUSCULAR; INTRAVENOUS
Status: DISCONTINUED | OUTPATIENT
Start: 2020-08-05 | End: 2020-08-06 | Stop reason: HOSPADM

## 2020-08-05 RX ORDER — SODIUM CHLORIDE, SODIUM LACTATE, POTASSIUM CHLORIDE, CALCIUM CHLORIDE 600; 310; 30; 20 MG/100ML; MG/100ML; MG/100ML; MG/100ML
INJECTION, SOLUTION INTRAVENOUS CONTINUOUS
Status: DISCONTINUED | OUTPATIENT
Start: 2020-08-05 | End: 2020-08-06 | Stop reason: HOSPADM

## 2020-08-05 RX ORDER — OXYCODONE HCL 5 MG/5 ML
10 SOLUTION, ORAL ORAL
Status: DISCONTINUED | OUTPATIENT
Start: 2020-08-05 | End: 2020-08-06 | Stop reason: HOSPADM

## 2020-08-05 RX ORDER — ONDANSETRON 2 MG/ML
4 INJECTION INTRAMUSCULAR; INTRAVENOUS
Status: DISCONTINUED | OUTPATIENT
Start: 2020-08-05 | End: 2020-08-06 | Stop reason: HOSPADM

## 2020-08-05 RX ORDER — HYDROMORPHONE HYDROCHLORIDE 1 MG/ML
0.4 INJECTION, SOLUTION INTRAMUSCULAR; INTRAVENOUS; SUBCUTANEOUS
Status: DISCONTINUED | OUTPATIENT
Start: 2020-08-05 | End: 2020-08-06 | Stop reason: HOSPADM

## 2020-08-05 RX ORDER — METOPROLOL TARTRATE 1 MG/ML
1 INJECTION, SOLUTION INTRAVENOUS
Status: DISCONTINUED | OUTPATIENT
Start: 2020-08-05 | End: 2020-08-06 | Stop reason: HOSPADM

## 2020-08-05 RX ORDER — HALOPERIDOL 5 MG/ML
1 INJECTION INTRAMUSCULAR
Status: DISCONTINUED | OUTPATIENT
Start: 2020-08-05 | End: 2020-08-06 | Stop reason: HOSPADM

## 2020-08-05 RX ORDER — CEFOTETAN DISODIUM 2 G/20ML
INJECTION, POWDER, FOR SOLUTION INTRAMUSCULAR; INTRAVENOUS PRN
Status: DISCONTINUED | OUTPATIENT
Start: 2020-08-05 | End: 2020-08-05 | Stop reason: SURG

## 2020-08-05 RX ORDER — MEPERIDINE HYDROCHLORIDE 25 MG/ML
6.25 INJECTION INTRAMUSCULAR; INTRAVENOUS; SUBCUTANEOUS
Status: DISCONTINUED | OUTPATIENT
Start: 2020-08-05 | End: 2020-08-06 | Stop reason: HOSPADM

## 2020-08-05 RX ORDER — HYDROMORPHONE HYDROCHLORIDE 1 MG/ML
0.1 INJECTION, SOLUTION INTRAMUSCULAR; INTRAVENOUS; SUBCUTANEOUS
Status: DISCONTINUED | OUTPATIENT
Start: 2020-08-05 | End: 2020-08-06 | Stop reason: HOSPADM

## 2020-08-05 RX ORDER — DEXAMETHASONE SODIUM PHOSPHATE 4 MG/ML
INJECTION, SOLUTION INTRA-ARTICULAR; INTRALESIONAL; INTRAMUSCULAR; INTRAVENOUS; SOFT TISSUE PRN
Status: DISCONTINUED | OUTPATIENT
Start: 2020-08-05 | End: 2020-08-05 | Stop reason: SURG

## 2020-08-05 RX ORDER — OXYCODONE HYDROCHLORIDE 5 MG/1
5 TABLET ORAL EVERY 4 HOURS PRN
Qty: 15 TAB | Refills: 0 | Status: SHIPPED | OUTPATIENT
Start: 2020-08-05 | End: 2020-08-09

## 2020-08-05 RX ORDER — LIDOCAINE HYDROCHLORIDE 20 MG/ML
INJECTION, SOLUTION EPIDURAL; INFILTRATION; INTRACAUDAL; PERINEURAL PRN
Status: DISCONTINUED | OUTPATIENT
Start: 2020-08-05 | End: 2020-08-05 | Stop reason: SURG

## 2020-08-05 RX ORDER — HYDROMORPHONE HYDROCHLORIDE 1 MG/ML
0.2 INJECTION, SOLUTION INTRAMUSCULAR; INTRAVENOUS; SUBCUTANEOUS
Status: DISCONTINUED | OUTPATIENT
Start: 2020-08-05 | End: 2020-08-06 | Stop reason: HOSPADM

## 2020-08-05 RX ADMIN — PROPOFOL 150 MG: 10 INJECTION, EMULSION INTRAVENOUS at 23:14

## 2020-08-05 RX ADMIN — MIDAZOLAM HYDROCHLORIDE 2 MG: 1 INJECTION, SOLUTION INTRAMUSCULAR; INTRAVENOUS at 23:05

## 2020-08-05 RX ADMIN — FENTANYL CITRATE 50 MCG: 50 INJECTION, SOLUTION INTRAMUSCULAR; INTRAVENOUS at 23:43

## 2020-08-05 RX ADMIN — FENTANYL CITRATE 50 MCG: 50 INJECTION, SOLUTION INTRAMUSCULAR; INTRAVENOUS at 23:05

## 2020-08-05 RX ADMIN — FENTANYL CITRATE 50 MCG: 50 INJECTION, SOLUTION INTRAMUSCULAR; INTRAVENOUS at 23:30

## 2020-08-05 RX ADMIN — METRONIDAZOLE 500 MG: 500 INJECTION, SOLUTION INTRAVENOUS at 21:15

## 2020-08-05 RX ADMIN — ROCURONIUM BROMIDE 35 MG: 10 INJECTION, SOLUTION INTRAVENOUS at 23:14

## 2020-08-05 RX ADMIN — CEFOTETAN DISODIUM 2 G: 2 INJECTION, POWDER, FOR SOLUTION INTRAMUSCULAR; INTRAVENOUS at 23:20

## 2020-08-05 RX ADMIN — LIDOCAINE HYDROCHLORIDE 20 MG: 20 INJECTION, SOLUTION EPIDURAL; INFILTRATION; INTRACAUDAL; PERINEURAL at 23:14

## 2020-08-05 RX ADMIN — POVIDONE-IODINE 15 ML: 10 SOLUTION TOPICAL at 22:47

## 2020-08-05 RX ADMIN — IOHEXOL 100 ML: 350 INJECTION, SOLUTION INTRAVENOUS at 19:19

## 2020-08-05 RX ADMIN — FENTANYL CITRATE 50 MCG: 50 INJECTION, SOLUTION INTRAMUSCULAR; INTRAVENOUS at 23:18

## 2020-08-05 RX ADMIN — SUGAMMADEX 150 MG: 100 INJECTION, SOLUTION INTRAVENOUS at 23:37

## 2020-08-05 RX ADMIN — DEXAMETHASONE SODIUM PHOSPHATE 4 MG: 4 INJECTION, SOLUTION INTRAMUSCULAR; INTRAVENOUS at 23:23

## 2020-08-05 RX ADMIN — SODIUM CHLORIDE, POTASSIUM CHLORIDE, SODIUM LACTATE AND CALCIUM CHLORIDE: 600; 310; 30; 20 INJECTION, SOLUTION INTRAVENOUS at 22:41

## 2020-08-05 RX ADMIN — ONDANSETRON 4 MG: 2 INJECTION INTRAMUSCULAR; INTRAVENOUS at 23:32

## 2020-08-05 RX ADMIN — CIPROFLOXACIN 400 MG: 2 INJECTION, SOLUTION INTRAVENOUS at 20:10

## 2020-08-05 SDOH — HEALTH STABILITY: MENTAL HEALTH: HOW OFTEN DO YOU HAVE A DRINK CONTAINING ALCOHOL?: NEVER

## 2020-08-05 ASSESSMENT — ENCOUNTER SYMPTOMS
CHILLS: 0
EYE REDNESS: 0
FEVER: 0
DIARRHEA: 1
MYALGIAS: 0
FLATUS: 0
NAUSEA: 1
BACK PAIN: 0
FLANK PAIN: 0
VOMITING: 1
CONSTIPATION: 0
BLOOD IN STOOL: 0
HEARTBURN: 0
EYE DISCHARGE: 0
ABDOMINAL PAIN: 1
ANOREXIA: 1

## 2020-08-05 ASSESSMENT — PAIN SCALES - GENERAL: PAIN_LEVEL: 1

## 2020-08-05 NOTE — PROGRESS NOTES
Subjective:      Sujata Upton is a 65 y.o. female who presents with Abdominal Pain (lower abdominal pain x3 days/ had vomiting and diarrhea on monday)            Patient is a 65-year-old female who presents to urgent care with diffuse lower abdominal pain for the last 3 days.  Patient reports that she started to have the discomfort Monday morning-followed by notable nausea, vomiting and diarrhea.  She reports that the vomiting and diarrhea subsided however she continued to dry heaves throughout the day.  She reports a profound decrease in appetite of which she is only had broth and a piece of dry toast over the last 2 days.  She does report slight urinary frequency-however this is been intermittent over the last week or so.  Patient did have a normal bowel movement this morning however that did not make a difference to her abdominal pain.  She does report movement makes the pain worse.  Patient has surgical history of hysterectomy other wise denies such.  She does report that she has had a history of polyps removed with prior colonoscopies however denies history of diverticulitis.  At rest patient reports that her pain is a 5 if she does not move.    Abdominal Pain  This is a new problem. The current episode started in the past 7 days. The onset quality is sudden. The problem occurs constantly. The problem has been unchanged. The pain is located in the RLQ, suprapubic region and LLQ. The pain is at a severity of 8/10. The pain is severe. Associated symptoms include anorexia, diarrhea, frequency, nausea and vomiting. Pertinent negatives include no constipation, dysuria, fever, flatus, hematuria, melena or myalgias. The pain is aggravated by movement. The pain is relieved by nothing. She has tried nothing for the symptoms. Her past medical history is significant for abdominal surgery.       Review of Systems   Constitutional: Negative for chills, fever and malaise/fatigue.   Eyes: Negative for discharge and  "redness.   Gastrointestinal: Positive for abdominal pain, anorexia, diarrhea, nausea and vomiting. Negative for blood in stool, constipation, flatus, heartburn and melena.   Genitourinary: Positive for frequency. Negative for dysuria, flank pain, hematuria and urgency.   Musculoskeletal: Negative for back pain, joint pain and myalgias.   Skin: Negative for rash.   All other systems reviewed and are negative.         Objective:     /60 (BP Location: Left arm, Patient Position: Sitting, BP Cuff Size: Adult)   Pulse 80   Temp 37.1 °C (98.8 °F)   Resp 14   Ht 1.651 m (5' 5\")   Wt 67.6 kg (149 lb)   SpO2 95%   BMI 24.79 kg/m²    PMH:  has a past medical history of Anesthesia, Chronic left shoulder pain (6/17/2019), Controlled substance agreement signed (3/2/2020), Esophageal reflux (1/9/2015), Flexural atopic dermatitis (7/31/2020), Headache (1/9/2015), Hot flushes, perimenopausal (1/9/2015), HTN (hypertension), benign (1/9/2015), Neck mass (12/2/2016), Neck tightness (9/20/2019), Pain (7/1), Psoriasis (10/1/2018), Routine general medical examination at a health care facility (8/16/2016), Sleep disturbance, unspecified (1/9/2015), and Stress reaction (8/18/2017). She also has no past medical history of Breast cancer (HCC).  MEDS: Reviewed .   ALLERGIES:   Allergies   Allergen Reactions   • Pcn [Penicillins] Anaphylaxis     SURGHX:   Past Surgical History:   Procedure Laterality Date   • KNEE ARTHROSCOPY Left 11/12/2015    Procedure: KNEE ARTHROSCOPY revision;  Surgeon: Adam Davila M.D.;  Location: [facility];  Service:    • MEDIAL MENISCECTOMY Left 11/12/2015    Procedure: MEDIAL MENISCECTOMY partial, lateral meniscectomy and synovectomy;  Surgeon: Adam Davila M.D.;  Location: [facility];  Service:    • KNEE ARTHROSCOPY Left 7/2/2015    Procedure: KNEE ARTHROSCOPY;  Surgeon: Adam Davila M.D.;  Location: [facility];  Service:    • MEDIAL MENISCECTOMY Left 7/2/2015 "    Procedure: MEDIAL MENISCECTOMY/ PARTIAL;  Surgeon: Adam Davila M.D.;  Location: [facility];  Service:    • OTHER  2004    mass removed from throat   • HYSTERECTOMY LAPAROSCOPY  2003   • CYST EXCISION  2000    off tonsil   • VEIN STRIPPING Bilateral 1998    lower extremities     SOCHX:  reports that she has never smoked. She has never used smokeless tobacco. She reports that she does not drink alcohol or use drugs.  FH: Family history was reviewed, no pertinent findings to report    Physical Exam  Vitals signs reviewed.   Constitutional:       General: She is not in acute distress.     Appearance: She is well-developed.   HENT:      Head: Normocephalic and atraumatic.      Right Ear: External ear normal.      Left Ear: External ear normal.      Mouth/Throat:      Pharynx: No oropharyngeal exudate.   Eyes:      Conjunctiva/sclera: Conjunctivae normal.      Pupils: Pupils are equal, round, and reactive to light.   Neck:      Musculoskeletal: Normal range of motion and neck supple.      Trachea: No tracheal deviation.   Cardiovascular:      Rate and Rhythm: Normal rate and regular rhythm.      Heart sounds: No murmur.   Pulmonary:      Effort: Pulmonary effort is normal. No respiratory distress.      Breath sounds: Normal breath sounds.   Abdominal:      Tenderness: There is abdominal tenderness. There is guarding. There is no right CVA tenderness or left CVA tenderness.          Comments: Hypoactive bowel sounds.   Diffuse lower abdominal tenderness localizing to the suprapubic region with associated guarding.Without rigidity is abdomen is soft. Mildly pos. Heel tap.    Musculoskeletal: Normal range of motion.   Skin:     General: Skin is warm.      Findings: No rash.   Neurological:      Mental Status: She is alert and oriented to person, place, and time.      Coordination: Coordination normal.   Psychiatric:         Behavior: Behavior normal.         Thought Content: Thought content normal.          Judgment: Judgment normal.                 Assessment/Plan:        1. Lower abdominal pain  - POCT Urinalysis    2. Nausea and vomiting, intractability of vomiting not specified, unspecified vomiting type  - POCT Urinalysis    Urinalysis only reveals trace amount of leukocyte esterase.  Patient is profoundly tender in particular across the lower abdomen with localization to the suprapubic region.  I do feel that patient needs labs and further imaging at this time.  Discussed options for outpatient setting of which the patient declines further work-up and prefers to have evaluation in the emergency room.  Due to a potential delay in care who think this is reasonable at this time.  Patient is to remain n.p.o. at this time and will meet up with her  and have evaluation in the emergency room.  Patient was stable throughout the duration of her care today.

## 2020-08-06 VITALS
HEIGHT: 65 IN | DIASTOLIC BLOOD PRESSURE: 82 MMHG | BODY MASS INDEX: 24.79 KG/M2 | TEMPERATURE: 97.3 F | RESPIRATION RATE: 16 BRPM | OXYGEN SATURATION: 93 % | SYSTOLIC BLOOD PRESSURE: 161 MMHG | HEART RATE: 70 BPM

## 2020-08-06 LAB — PATHOLOGY CONSULT NOTE: NORMAL

## 2020-08-06 NOTE — ANESTHESIA TIME REPORT
Anesthesia Start and Stop Event Times     Date Time Event    8/5/2020 11:03 PM Ready for Procedure    8/5/2020 11:05 PM Anesthesia Start        Responsible Staff  08/05/20    Name Role Begin End    Jayde Looney M.D. Anesthesiologist 08/05/20 11:05 PM Not recorded        Preop Diagnosis (Free Text):  Pre-op Diagnosis     Appendicitis         Preop Diagnosis (Codes):    Post op Diagnosis  Acute appendicitis      Premium Reason  A. 3PM - 7AM    Comments:

## 2020-08-06 NOTE — OP REPORT
Post OP Note    PreOp Diagnosis: Acute appendicitis    PostOp Diagnosis: Acute appendicitis without rupture    Procedure(s):  APPENDECTOMY, LAPAROSCOPIC - Wound Class: Clean Contaminated    Surgeon(s):  Amando Duque M.D.    Anesthesiologist/Type of Anesthesia:  Anesthesiologist: Jayde Looney M.D./General    Surgical Staff:  Circulator: Amira Millan R.N.  Limb Prince: Doc Vidalis  Scrub Person: Willie Saleem    Specimens removed if any:  ID Type Source Tests Collected by Time Destination   A : Appendix Tissue Appendix PATHOLOGY SPECIMEN Amando Duque M.D. 8/5/2020 11:39 PM        Estimated Blood Loss: 5 cc    Findings: Dilated and inflamed appendix without rupture    Complications: No complications were noted    Indication: Patient is a 65-year-old female who presented with signs symptoms and CT scan findings consistent with acute appendicitis.  Patient was counseled extensively as of the risk first benefits of surgery and agreed to proceed fully informed.    Description of the procedure:    The patient was prepped and draped in the standard sterile surgical fashion after induction of general anesthesia.  Appropriate timeout had been performed and antibiotics delivered.  A periumbilical verres insertion was performed and the abdomen was insufflated to 15 mmHg CO2.  A 5 mm Visiport was applied.  A suprapubic 5 mm trocar and a subxiphoid 12 mm trocar placed under direct visualization.    The appendix was located in its right lower quadrant position.  It was dilated and inflamed but not perforated.  A harmonic scalpel was used to carefully take the mesoappendix down to the base of the appendix.  A 45 mm vascular load stapler was used to transect the appendix at its base.  An Endo Catch was used to retrieve the appendix via the subxiphoid incision.    The right lower quadrant was irrigated until clear.  The staple line was intact.  Hemostasis was meticulously achieved.  An Endo Close device was used to  close the fascia of the subxiphoid trocar.  Monocryl was used for skin edges.  Dermabond was applied as a dressing.  The patient was extubated to recovery in satisfactory condition.          8/5/2020 11:43 PM Amando Duque M.D.

## 2020-08-06 NOTE — ANESTHESIA PREPROCEDURE EVALUATION
Relevant Problems   PULMONARY   (+) Dyspnea      NEURO   (+) Headache      CARDIAC   (+) HTN (hypertension), benign      GI   (+) Esophageal reflux       Physical Exam    Airway   Mallampati: II  TM distance: >3 FB  Neck ROM: full       Cardiovascular - normal exam  Rhythm: regular  Rate: normal  (-) murmur     Dental - normal exam           Pulmonary - normal exam  Breath sounds clear to auscultation     Abdominal    Neurological - normal exam                 Anesthesia Plan    ASA 2- EMERGENT   ASA physical status emergent criteria: acute peritonitis    Plan - general       Airway plan will be ETT      Plan Factors:   Patient was previously instructed to abstain from smoking on day of procedure.  Patient did not smoke on day of procedure.      Induction: intravenous          Informed Consent:    Anesthetic plan and risks discussed with patient.    Use of blood products discussed with: patient whom consented to blood products.

## 2020-08-06 NOTE — ED TRIAGE NOTES
"Bib  for above complaints.     Chief Complaint   Patient presents with   • Abdominal Pain     since monday. went to urgent care for eval and sent here. pt states pain is in the midline under umbilicus but spreads across abdomen.    • Nausea/Vomiting/Diarrhea     only on monday.      BP (!) 174/88   Pulse 76   Temp 36.8 °C (98.2 °F) (Temporal)   Resp 16   Ht 1.651 m (5' 5\")   SpO2 99%   Breastfeeding No   BMI 24.79 kg/m²     "

## 2020-08-06 NOTE — ANESTHESIA POSTPROCEDURE EVALUATION
Patient: Sujata Upton    Procedure Summary     Date: 08/05/20 Room / Location:  OR  / SURGERY [facility]    Anesthesia Start: 2305 Anesthesia Stop: 2355    Procedure: APPENDECTOMY, LAPAROSCOPIC (Abdomen) Diagnosis: (Acute Appendicitis )    Surgeon: Amando Duque M.D. Responsible Provider: Jayde Looney M.D.    Anesthesia Type: general ASA Status: 2 - Emergent          Final Anesthesia Type: general  Last vitals  BP   Blood Pressure : (Abnormal) 161/82, NIBP: 109/58    Temp   36.6 °C (97.9 °F)    Pulse   Pulse: 70   Resp   12    SpO2   98 %      Anesthesia Post Evaluation    Patient location during evaluation: PACU  Patient participation: complete - patient participated  Level of consciousness: awake and alert  Pain score: 1    Airway patency: patent  Anesthetic complications: no  Cardiovascular status: hemodynamically stable  Respiratory status: acceptable  Hydration status: euvolemic    PONV: none           Nurse Pain Score: 5 (NPRS)

## 2020-08-06 NOTE — OR NURSING
2351: To PACU from OR via gurney, drowsy but awake, respirations spontaneous and non-labored. Denies pain and nausea. Lap sites x 3 w/o drainage or dressings.  0005: Takes sips po soda. Tolerates RA.  0014: Spouse contacted and on his way back to hospital.  0020: Stable for stage 2 level of care   0040: dressed, up in wheelchair awaiting spouse's return. No change in surgical sites.  0053: D/Riccardo to care of family post uneventful stay in PACU 2.

## 2020-08-06 NOTE — CONSULTS
General Surgery Consult    CHIEF COMPLAINT: Abdominal pain.     HISTORY OF PRESENT ILLNESS: The patient is a 65 y.o. female, who presents with abdominal pain.  It began on Monday with associated nausea and vomiting.  It became so severe that she presented to the emergency department at [facility].  Here she underwent a work-up to include a CT scan consistent with acute appendicitis.  General surgery was consulted for evaluation and management.  She is a retired nurse at [employer]..     PAST MEDICAL HISTORY:  has a past medical history of Anesthesia, Chronic left shoulder pain (6/17/2019), Controlled substance agreement signed (3/2/2020), Esophageal reflux (1/9/2015), Flexural atopic dermatitis (7/31/2020), Headache (1/9/2015), Hot flushes, perimenopausal (1/9/2015), HTN (hypertension), benign (1/9/2015), Neck mass (12/2/2016), Neck tightness (9/20/2019), Pain (7/1), Psoriasis (10/1/2018), Routine general medical examination at a health care facility (8/16/2016), Sleep disturbance, unspecified (1/9/2015), and Stress reaction (8/18/2017).     PAST SURGICAL HISTORY:  has a past surgical history that includes hysterectomy laparoscopy (2003); cyst excision (2000); vein stripping (Bilateral, 1998); other (2004); knee arthroscopy (Left, 11/12/2015); medial meniscectomy (Left, 11/12/2015); knee arthroscopy (Left, 7/2/2015); and medial meniscectomy (Left, 7/2/2015).     ALLERGIES:   Allergies   Allergen Reactions   • Pcn [Penicillins] Anaphylaxis        CURRENT MEDICATIONS:   Home Medications     Reviewed by David Stephens R.N. (Registered Nurse) on 08/05/20 at 2225  Med List Status: Not Addressed   Medication Last Dose Status   acetaminophen/caffeine/butalbital 325-40-50 mg (FIORICET) -40 MG Tab  Active   betamethasone, augmented, (DIPROLENE) 0.05 % gel  Active   Calcipotriene 0.005 % Solution  Active   Calcium Carbonate-Vit D-Min (CALCIUM 1200 PO)  Active   cyclobenzaprine (FLEXERIL) 10 MG Tab  Active  "  ENSTILAR 0.005-0.064 % Foam  Active   estradiol (ESTRACE) 0.5 MG tablet  Active   Eszopiclone 3 MG Tab  Active   hydrOXYzine pamoate (VISTARIL) 25 MG Cap  Active   ibuprofen (MOTRIN) 200 MG Tab  Active   losartan (COZAAR) 25 MG Tab  Active   Salicylic Acid 3 % Shampoo  Active   sulfacetamide (SULAMYD) 10 % Solution  Active   tacrolimus (PROTOPIC) 0.1 % Ointment  Active   therapeutic multivitamin-minerals (THERAGRAN-M) Tab  Active   triamcinolone acetonide (KENALOG) 0.1 % Cream  Active   valACYclovir (VALTREX) 500 MG Tab  Active                FAMILY HISTORY:   Family History   Problem Relation Age of Onset   • Cancer Mother         breast cancer   • Heart Disease Mother    • Hypertension Mother    • Dementia Mother    • Diabetes Mother    • Breast Cancer Mother    • Stroke Other         SOCIAL HISTORY:   Social History     Tobacco Use   • Smoking status: Never Smoker   • Smokeless tobacco: Never Used   Substance and Sexual Activity   • Alcohol use: No     Frequency: Never   • Drug use: No   • Sexual activity: Yes     Partners: Male     Birth control/protection: Surgical       REVIEW OF SYSTEMS: Comprehensive review of systems was negative aside from abdominal pain described in the history and physical    PHYSICAL EXAMINATION:     GENERAL: The patient is in no acute distress.   VITAL SIGNS: BP (!) 161/82   Pulse 81   Temp 36.8 °C (98.2 °F) (Temporal)   Resp 20   Ht 1.651 m (5' 5\")   SpO2 95%   HEAD AND NECK: Demonstrates symmetric, reactive pupils. Extraocular muscles   are intact. Nares and oropharynx are clear.   NECK: Supple. No adenopathy.  CHEST:No respiratory distress.    CARDIOVASCULAR: Regular rate. The extremities are well perfused.   ABDOMEN: Tender to palpation in the suprapubic region.  No guarding..   EXTREMITIES: Examination of the upper and lower extremities demonstrates no cyanosis edema or clubbing.  NEUROLOGIC: Alert & oriented x 3, Normal motor function, Normal sensory function, No focal " deficits noted.    LABORATORY VALUES:   Recent Labs     08/05/20  1750   WBC 7.4   RBC 4.26   HEMOGLOBIN 13.3   HEMATOCRIT 41.1   MCV 96.5   MCH 31.2   MCHC 32.4*   RDW 46.3   PLATELETCT 282   MPV 10.4     Recent Labs     08/05/20  1750   SODIUM 141   POTASSIUM 4.1   CHLORIDE 102   CO2 25   GLUCOSE 88   BUN 11   CREATININE 0.62   CALCIUM 9.2     Recent Labs     08/05/20  1750   ASTSGOT 25   ALTSGPT 13   TBILIRUBIN 0.3   ALKPHOSPHAT 85   GLOBULIN 2.8            IMAGING:   CT-ABDOMEN-PELVIS WITH   Final Result         1.  Acute appendicitis without evidence of perforation or intra-abdominal abscess.   2.  Hepatomegaly and hepatic steatosis.                   IMPRESSION AND PLAN:     1.  Acute appendicitis.    1.  The patient will be taken to the operating room for a laparoscopic appendectomy. The surgical conduct was explained. Potential complications including but not limited to infection, bleeding, damage to adjacent structures, anesthetic complications were discussed in detail. Questions were elicited and answered to her satisfaction. She understands the rationale for surgery and elects to proceed.  Operative consent signed.        ___________________________________   Amando Duque M.D.    DD: 8/5/2020 DT: 10:31 PM

## 2020-08-06 NOTE — ANESTHESIA PROCEDURE NOTES
Airway    Date/Time: 8/5/2020 11:18 PM  Performed by: Jayde Looney M.D.  Authorized by: Jayde Looney M.D.     Location:  OR  Urgency:  Elective  Indications for Airway Management:  Anesthesia      Spontaneous Ventilation: absent    Sedation Level:  Deep  Preoxygenated: Yes    Patient Position:  Sniffing  Final Airway Type:  Endotracheal airway  Final Endotracheal Airway:  ETT  Cuffed: Yes    Technique Used for Successful ETT Placement:  Direct laryngoscopy    Insertion Site:  Oral  Blade Type:  Letty  Laryngoscope Blade/Videolaryngoscope Blade Size:  3  ETT Size (mm):  7.0  Measured from:  Gums  ETT to Gums (cm):  21  Placement Verified by: auscultation and capnometry    Cormack-Lehane Classification:  Grade IIa - partial view of glottis  Number of Attempts at Approach:  1  Ventilation Between Attempts:  BVM  Number of Other Approaches Attempted:  0

## 2020-08-06 NOTE — ED PROVIDER NOTES
ED Provider Note    CHIEF COMPLAINT  Chief Complaint   Patient presents with   • Abdominal Pain     since monday. went to urgent care for eval and sent here. pt states pain is in the midline under umbilicus but spreads across abdomen.    • Nausea/Vomiting/Diarrhea     only on monday.        HPI  Sujata Upton is a 65 y.o. female who presents complaining of suprapubic and bilateral lower quadrant abdominal pain since Monday.  Patient states that she pain started on Monday and she had on that day she had associated nausea vomiting and diarrhea without blood or mucus.  She states that the vomiting and diarrhea subsided but she has had constant pain in her suprapubic and lower abdomen since then.  Thing makes the pain better or worse.  She states she did have a normal bowel movement without blood or mucus today.  She denies any fevers or chills sore throat cough or cold symptoms.  Currently the pain is about a 4/10.  She still has her gallbladder and appendix.      REVIEW OF SYSTEMS  HEENT:  No ear pain, congestion or sore throat   EYES: no discharge redness or vision changes  CARDIAC: no chest pain, palpitations    PULMONARY: no dyspnea, cough or congestion   GI: no vomiting diarrhea positive  : no dysuria, back pain or hematuria   Neuro: no weakness, numbness aphasia or headache  Musculoskeletal: no swelling deformity or pain no joint swelling  Endocrine: no fevers, sweating, weight loss   SKIN: no rash, erythema or contusions     See history of present illness all other systems are negative      PAST MEDICAL HISTORY  Past Medical History:   Diagnosis Date   • Flexural atopic dermatitis 7/31/2020   • Controlled substance agreement signed 3/2/2020   • Neck tightness 9/20/2019   • Chronic left shoulder pain 6/17/2019   • Psoriasis 10/1/2018   • Stress reaction 8/18/2017   • Neck mass 12/2/2016   • Routine general medical examination at a health care facility 8/16/2016   • HTN (hypertension), benign 1/9/2015   •  Sleep disturbance, unspecified 1/9/2015   • Esophageal reflux 1/9/2015   • Headache 1/9/2015   • Hot flushes, perimenopausal 1/9/2015   • Pain 7/1    left knee   • Anesthesia     PONV       FAMILY HISTORY  Family History   Problem Relation Age of Onset   • Cancer Mother         breast cancer   • Heart Disease Mother    • Hypertension Mother    • Dementia Mother    • Diabetes Mother    • Breast Cancer Mother    • Stroke Other        SOCIAL HISTORY  Social History     Socioeconomic History   • Marital status:      Spouse name: Not on file   • Number of children: Not on file   • Years of education: Not on file   • Highest education level: Not on file   Occupational History   • Not on file   Social Needs   • Financial resource strain: Not on file   • Food insecurity     Worry: Not on file     Inability: Not on file   • Transportation needs     Medical: Not on file     Non-medical: Not on file   Tobacco Use   • Smoking status: Never Smoker   • Smokeless tobacco: Never Used   Substance and Sexual Activity   • Alcohol use: No     Frequency: Never   • Drug use: No   • Sexual activity: Yes     Partners: Male     Birth control/protection: Surgical   Lifestyle   • Physical activity     Days per week: Not on file     Minutes per session: Not on file   • Stress: Not on file   Relationships   • Social connections     Talks on phone: Not on file     Gets together: Not on file     Attends Yarsani service: Not on file     Active member of club or organization: Not on file     Attends meetings of clubs or organizations: Not on file     Relationship status: Not on file   • Intimate partner violence     Fear of current or ex partner: Not on file     Emotionally abused: Not on file     Physically abused: Not on file     Forced sexual activity: Not on file   Other Topics Concern   • Not on file   Social History Narrative   • Not on file       SURGICAL HISTORY  Past Surgical History:   Procedure Laterality Date   • KNEE  "ARTHROSCOPY Left 11/12/2015    Procedure: KNEE ARTHROSCOPY revision;  Surgeon: Adam Davila M.D.;  Location: SURGERY [facility];  Service:    • MEDIAL MENISCECTOMY Left 11/12/2015    Procedure: MEDIAL MENISCECTOMY partial, lateral meniscectomy and synovectomy;  Surgeon: Adam Davila M.D.;  Location: [facility];  Service:    • KNEE ARTHROSCOPY Left 7/2/2015    Procedure: KNEE ARTHROSCOPY;  Surgeon: Adma Davila M.D.;  Location: SURGERY [facility];  Service:    • MEDIAL MENISCECTOMY Left 7/2/2015    Procedure: MEDIAL MENISCECTOMY/ PARTIAL;  Surgeon: dAam Davila M.D.;  Location: [facility];  Service:    • OTHER  2004    mass removed from throat   • HYSTERECTOMY LAPAROSCOPY  2003   • CYST EXCISION  2000    off tonsil   • VEIN STRIPPING Bilateral 1998    lower extremities       CURRENT MEDICATIONS  Home Medications    **Home medications have not yet been reviewed for this encounter**          ALLERGIES  Allergies   Allergen Reactions   • Pcn [Penicillins] Anaphylaxis       PHYSICAL EXAM  VITAL SIGNS: /88   Pulse 76   Temp 36.8 °C (98.2 °F) (Temporal)   Resp 18   Ht 1.651 m (5' 5\")   SpO2 98%   Breastfeeding No   BMI 24.79 kg/m²       Constitutional: Well developed, Well nourished, No acute distress, Non-toxic appearance.   HENT: Normocephalic, Atraumatic, Bilateral external ears normal, Oropharynx moist, No oral exudates, Nose normal.   Eyes: PERRLA, EOMI, Conjunctiva normal, No discharge.   Neck: Normal range of motion, No tenderness, Supple, No stridor.   Lymphatic: No lymphadenopathy noted.   Cardiovascular: Normal heart rate, Normal rhythm, No murmurs, No rubs, No gallops.   Thorax & Lungs: Normal breath sounds, No respiratory distress, No wheezing, No chest tenderness.   Abdomen: Positive tenderness in the suprapubic area and bilateral lower quadrants worse in the left lower quadrant no rebound masses or peritoneal signs bowel sounds are normal abdomen is soft to " palpation  Skin: Warm, Dry, No erythema, No rash.   Back: No tenderness, No CVA tenderness.   Extremities: Intact distal pulses, No edema, No tenderness, No cyanosis, No clubbing.   Neurologic: Alert & oriented x 3, Normal motor function, Normal sensory function, No focal deficits noted.       RADIOLOGY/PROCEDURES  CT-ABDOMEN-PELVIS WITH   Final Result         1.  Acute appendicitis without evidence of perforation or intra-abdominal abscess.   2.  Hepatomegaly and hepatic steatosis.                     COURSE & MEDICAL DECISION MAKING  Pertinent Labs & Imaging studies reviewed. (See chart for details)  Differential diagnosis: Diverticulitis, cystitis, UTI, Appendicitis      Results for orders placed or performed during the hospital encounter of 08/05/20   CBC WITH DIFFERENTIAL   Result Value Ref Range    WBC 7.4 4.8 - 10.8 K/uL    RBC 4.26 4.20 - 5.40 M/uL    Hemoglobin 13.3 12.0 - 16.0 g/dL    Hematocrit 41.1 37.0 - 47.0 %    MCV 96.5 81.4 - 97.8 fL    MCH 31.2 27.0 - 33.0 pg    MCHC 32.4 (L) 33.6 - 35.0 g/dL    RDW 46.3 35.9 - 50.0 fL    Platelet Count 282 164 - 446 K/uL    MPV 10.4 9.0 - 12.9 fL    Neutrophils-Polys 46.40 44.00 - 72.00 %    Lymphocytes 35.90 22.00 - 41.00 %    Monocytes 7.80 0.00 - 13.40 %    Eosinophils 8.80 (H) 0.00 - 6.90 %    Basophils 0.80 0.00 - 1.80 %    Immature Granulocytes 0.30 0.00 - 0.90 %    Nucleated RBC 0.00 /100 WBC    Neutrophils (Absolute) 3.43 2.00 - 7.15 K/uL    Lymphs (Absolute) 2.65 1.00 - 4.80 K/uL    Monos (Absolute) 0.58 0.00 - 0.85 K/uL    Eos (Absolute) 0.65 (H) 0.00 - 0.51 K/uL    Baso (Absolute) 0.06 0.00 - 0.12 K/uL    Immature Granulocytes (abs) 0.02 0.00 - 0.11 K/uL    NRBC (Absolute) 0.00 K/uL   COMP METABOLIC PANEL   Result Value Ref Range    Sodium 141 135 - 145 mmol/L    Potassium 4.1 3.6 - 5.5 mmol/L    Chloride 102 96 - 112 mmol/L    Co2 25 20 - 33 mmol/L    Anion Gap 14.0 7.0 - 16.0    Glucose 88 65 - 99 mg/dL    Bun 11 8 - 22 mg/dL    Creatinine 0.62 0.50 -  1.40 mg/dL    Calcium 9.2 8.4 - 10.2 mg/dL    AST(SGOT) 25 12 - 45 U/L    ALT(SGPT) 13 2 - 50 U/L    Alkaline Phosphatase 85 30 - 99 U/L    Total Bilirubin 0.3 0.1 - 1.5 mg/dL    Albumin 4.4 3.2 - 4.9 g/dL    Total Protein 7.2 6.0 - 8.2 g/dL    Globulin 2.8 1.9 - 3.5 g/dL    A-G Ratio 1.6 g/dL   LIPASE   Result Value Ref Range    Lipase 30 7 - 58 U/L   URINALYSIS,CULTURE IF INDICATED    Specimen: Urine, Clean Catch   Result Value Ref Range    Color Yellow     Character Clear     Specific Gravity 1.015 <1.035    Ph 7.0 5.0 - 8.0    Glucose Negative Negative mg/dL    Ketones 15 (A) Negative mg/dL    Protein Negative Negative mg/dL    Bilirubin Negative Negative    Nitrite Negative Negative    Leukocyte Esterase Small (A) Negative    Occult Blood Negative Negative    Micro Urine Req Microscopic    ESTIMATED GFR   Result Value Ref Range    GFR If African American >60 >60 mL/min/1.73 m 2    GFR If Non African American >60 >60 mL/min/1.73 m 2   URINE MICROSCOPIC (W/UA)   Result Value Ref Range    WBC 5-10 (A) /hpf    RBC 0-2 /hpf    Bacteria Moderate (A) None /hpf    Epithelial Cells Moderate (A) Few /hpf   COVID/SARS CoV-2 PCR    Specimen: Nasopharyngeal; Respirate   Result Value Ref Range    COVID Order Status Received    SARS-CoV-2, PCR (In-House)   Result Value Ref Range    SARS-CoV-2 Source NP Swab        7:45 PM patient has acute appendicitis without sepsis or rupture.  I ordered Cipro and Flagyl and a COVID test.  I have paged Dr. Duque for further evaluation and treatment.    8:42 PM I spoke with Dr. Duque who will take her to the OR from the emergency department and hopefully discharge her home life as long as her procedure goes well.  The patient understands that she will go to the OR from here her pain is under control and her antibiotics have been given.  The Covid test is pending.    FINAL IMPRESSION  1. Acute appendicitis with localized peritonitis, without perforation, abscess, or gangrene              Electronically signed by: Prema Pelayo M.D., 8/5/2020 6:41 PM

## 2020-08-06 NOTE — DISCHARGE INSTRUCTIONS
ACTIVITY: Rest and take it easy for the first 24 hours.  A responsible adult is recommended to remain with you during that time.  It is normal to feel sleepy.  We encourage you to not do anything that requires balance, judgment or coordination.    MILD FLU-LIKE SYMPTOMS ARE NORMAL. YOU MAY EXPERIENCE GENERALIZED MUSCLE ACHES, THROAT IRRITATION, HEADACHE AND/OR SOME NAUSEA.    FOR 24 HOURS DO NOT:  Drive, operate machinery or run household appliances.  Drink beer or alcoholic beverages.   Make important decisions or sign legal documents.    SPECIAL INSTRUCTIONS:    1.   The pain medication is extremely constipating.    Take a stool softener and drink lots of water.  It also can be addicting.  Please try to transition to an over the counter pain remedy as soon as possible.     2.  Alternating ice and heat for 30 minutes can greatly reduce your pain.     3.   It's ok to shower on the day after your surgery.   Do not scrub the incisions.     4.   After laparoscopy, it's common to have shoulder pain or pain when you take a deep breath.   If the pain radiates down your arm, call or present to the ER.    5.   Please call for redness or drainage from the wound sites that persists.     6.   Feel free to call with questions at [phone].   If you have paperwork that needs filling out, please contact us at this number.     7.   Follow up with me in 1-2 weeks for your postoperative exam.     8.   Activity restrictions vary according to the surgery.   If it hurts, don't do it.   You may begin light exercise at 7-10 days.     Amando Duque MD FACS [facility]      DIET: To avoid nausea, slowly advance diet as tolerated, avoiding spicy or greasy foods for the first day.  Add more substantial food to your diet according to your physician's instructions.  INCREASE FLUIDS AND FIBER TO AVOID CONSTIPATION.    FOLLOW-UP APPOINTMENT:  A follow-up appointment should be arranged with your doctor; call to schedule.    You  should CALL YOUR PHYSICIAN if you develop:  Fever greater than 101 degrees F.  Pain not relieved by medication, or persistent nausea or vomiting.  Excessive bleeding (blood soaking through dressing) or unexpected drainage from the wound.  Extreme redness or swelling around the incision site, drainage of pus or foul smelling drainage.  Inability to urinate or empty your bladder within 8 hours.  Problems with breathing or chest pain.    You should call 911 if you develop problems with breathing or chest pain.  If you are unable to contact your doctor or surgical center, you should go to the nearest emergency room or urgent care center.  Physician's telephone #: [phone]    If any questions arise, call your doctor.  If your doctor is not available, please feel free to call the Surgical Center at [phone].  The Center is open Monday through Friday from 7AM to 7PM.  You can also call the [employer] HOTLINE open 24 hours/day, 7 days/week and speak to a nurse at [phone], or toll free at [phone].    A registered nurse may call you a few days after your surgery to see how you are doing after your procedure.    MEDICATIONS: Resume taking daily medication.  Take prescribed pain medication with food.  If no medication is prescribed, you may take non-aspirin pain medication if needed.  PAIN MEDICATION CAN BE VERY CONSTIPATING.  Take a stool softener or laxative such as senokot, pericolace, or milk of magnesia if needed.    Prescription given for oxycodone.  Last pain medication given at N/A.    If your physician has prescribed pain medication that includes Acetaminophen (Tylenol), do not take additional Acetaminophen (Tylenol) while taking the prescribed medication.    Depression / Suicide Risk    As you are discharged from this [facility] facility, it is important to learn how to keep safe from harming yourself.    Recognize the warning signs:  · Abrupt changes in personality, positive or negative- including  increase in energy   · Giving away possessions  · Change in eating patterns- significant weight changes-  positive or negative  · Change in sleeping patterns- unable to sleep or sleeping all the time   · Unwillingness or inability to communicate  · Depression  · Unusual sadness, discouragement and loneliness  · Talk of wanting to die  · Neglect of personal appearance   · Rebelliousness- reckless behavior  · Withdrawal from people/activities they love  · Confusion- inability to concentrate     If you or a loved one observes any of these behaviors or has concerns about self-harm, here's what you can do:  · Talk about it- your feelings and reasons for harming yourself  · Remove any means that you might use to hurt yourself (examples: pills, rope, extension cords, firearm)  · Get professional help from the community (Mental Health, Substance Abuse, psychological counseling)  · Do not be alone:Call your Safe Contact- someone whom you trust who will be there for you.  · Call your local CRISIS HOTLINE 343-2997 or 318-253-7802  · Call your local Children's Mobile Crisis Response Team [place] (080) 952-1957 or www.[employer]  · Call the toll free National Suicide Prevention Hotlines   · National Suicide Prevention Lifeline 921-650-TTWU (0475)  · National Hope Line Network 800-SUICIDE (133-8845)

## 2020-11-10 ENCOUNTER — TELEPHONE (OUTPATIENT)
Dept: MEDICAL GROUP | Facility: MEDICAL CENTER | Age: 65
End: 2020-11-10

## 2020-11-11 DIAGNOSIS — G47.00 INSOMNIA, UNSPECIFIED TYPE: ICD-10-CM

## 2020-11-11 RX ORDER — ESZOPICLONE 3 MG/1
3 TABLET, FILM COATED ORAL NIGHTLY PRN
Qty: 30 TAB | Refills: 2 | Status: SHIPPED | OUTPATIENT
Start: 2020-11-11 | End: 2021-02-09

## 2021-01-21 NOTE — TELEPHONE ENCOUNTER
1. Caller Name: Sujata                      Call Back Number: [phone]     2. Message: Pt would like the results of her recent Bone Density Scan please.    3. Patient approves office to leave a detailed voicemail/MyChart message: yes     2 seconds or less

## 2021-04-22 RX ORDER — ESTRADIOL 0.5 MG/1
TABLET ORAL
Qty: 90 TABLET | Refills: 0 | Status: SHIPPED | OUTPATIENT
Start: 2021-04-22 | End: 2021-07-19

## 2021-07-19 RX ORDER — ESTRADIOL 0.5 MG/1
TABLET ORAL
Qty: 90 TABLET | Refills: 0 | Status: SHIPPED | OUTPATIENT
Start: 2021-07-19

## (undated) DEVICE — HEAD HOLDER JUNIOR/ADULT

## (undated) DEVICE — BAG RETRIEVAL 10ML (10EA/BX)

## (undated) DEVICE — GLOVE BIOGEL SZ 8 SURGICAL PF LTX - (50PR/BX 4BX/CA)

## (undated) DEVICE — GLOVE BIOGEL PI INDICATOR SZ 7.5 SURGICAL PF LF -(50/BX 4BX/CA)

## (undated) DEVICE — PROTECTOR ULNA NERVE - (36PR/CA)

## (undated) DEVICE — SUTURE 0 VICRYL PLUS UR-6 - 27 INCH (36/BX)

## (undated) DEVICE — STAPLE 45MM VASCULAR WHITE 2.5MM (12EA/BX)

## (undated) DEVICE — SET SUCTION/IRRIGATION WITH DISPOSABLE TIP (6/CA )PART #0250-070-520 IS A SUB

## (undated) DEVICE — TROCAR 5X100 NON BLADED Z-TH - READ KII (6/BX)

## (undated) DEVICE — GLOVE, LITE (PAIR)

## (undated) DEVICE — LACTATED RINGERS INJ 1000 ML - (14EA/CA 60CA/PF)

## (undated) DEVICE — ADHESIVE DERMABOND HVD MINI (12EA/BX)

## (undated) DEVICE — CANISTER SUCTION RIGID RED 1500CC (40EA/CA)

## (undated) DEVICE — SENSOR SPO2 NEO LNCS ADHESIVE (20/BX) SEE USER NOTES

## (undated) DEVICE — ELECTRODE 5MM LHK LAPSCP STERILE DISP- MEGADYNE  (5/CA)

## (undated) DEVICE — ELECTRODE DUAL RETURN W/ CORD - (50/PK)

## (undated) DEVICE — TROCAR LAPSCP 100MM 12MM NTHRD - (6/BX)

## (undated) DEVICE — ELECTRODE 850 FOAM ADHESIVE - HYDROGEL RADIOTRNSPRNT (50/PK)

## (undated) DEVICE — CHLORAPREP 26 ML APPLICATOR - ORANGE TINT(25/CA)

## (undated) DEVICE — KIT ANESTHESIA W/CIRCUIT & 3/LT BAG W/FILTER (20EA/CA)

## (undated) DEVICE — TUBE CONNECTING SUCTION - CLEAR PLASTIC STERILE 72 IN (50EA/CA)

## (undated) DEVICE — WATER IRRIGATION STERILE 1000ML (12EA/CA)

## (undated) DEVICE — NEPTUNE 4 PORT MANIFOLD - (20/PK)

## (undated) DEVICE — TUBING INSUFFLATION - (10/BX)

## (undated) DEVICE — STAPLER 45MM ARTICULATING - ENDO (3EA/BX)

## (undated) DEVICE — SUTURE 4-0 MONOCRYL PLUS PS-2 - 27 INCH (36/BX)

## (undated) DEVICE — SEALER VESSEL HARMONIC ACE PLUS WITH ADVANCED HEMOSTASIS 36CM (1/EA)

## (undated) DEVICE — SODIUM CHL IRRIGATION 0.9% 1000ML (12EA/CA)

## (undated) DEVICE — CANNULA W/SEAL 5X100 Z-THRE - ADED KII (12/BX)

## (undated) DEVICE — GOWN WARMING STANDARD FLEX - (30/CA)

## (undated) DEVICE — SUCTION INSTRUMENT YANKAUER BULBOUS TIP W/O VENT (50EA/CA)

## (undated) DEVICE — Device

## (undated) DEVICE — MASK ANESTHESIA ADULT  - (100/CA)

## (undated) DEVICE — BAG SPONGE COUNT 10.25 X 32 - BLUE (250/CA)

## (undated) DEVICE — HUMID-VENT HEAT AND MOISTURE EXCHANGE- (50/BX)